# Patient Record
Sex: MALE | Race: WHITE | NOT HISPANIC OR LATINO | Employment: FULL TIME | URBAN - METROPOLITAN AREA
[De-identification: names, ages, dates, MRNs, and addresses within clinical notes are randomized per-mention and may not be internally consistent; named-entity substitution may affect disease eponyms.]

---

## 2017-03-30 ENCOUNTER — LAB VISIT (OUTPATIENT)
Dept: LAB | Facility: HOSPITAL | Age: 42
End: 2017-03-30
Payer: COMMERCIAL

## 2017-03-30 ENCOUNTER — OFFICE VISIT (OUTPATIENT)
Dept: FAMILY MEDICINE | Facility: CLINIC | Age: 42
End: 2017-03-30
Payer: COMMERCIAL

## 2017-03-30 VITALS
HEART RATE: 75 BPM | TEMPERATURE: 98 F | HEIGHT: 70 IN | OXYGEN SATURATION: 98 % | DIASTOLIC BLOOD PRESSURE: 78 MMHG | SYSTOLIC BLOOD PRESSURE: 130 MMHG | BODY MASS INDEX: 31.94 KG/M2 | WEIGHT: 223.13 LBS

## 2017-03-30 DIAGNOSIS — Z00.00 ANNUAL PHYSICAL EXAM: Primary | ICD-10-CM

## 2017-03-30 DIAGNOSIS — Z00.00 ANNUAL PHYSICAL EXAM: ICD-10-CM

## 2017-03-30 LAB
ALBUMIN SERPL BCP-MCNC: 4 G/DL
ALP SERPL-CCNC: 81 U/L
ALT SERPL W/O P-5'-P-CCNC: 29 U/L
ANION GAP SERPL CALC-SCNC: 8 MMOL/L
AST SERPL-CCNC: 44 U/L
BASOPHILS # BLD AUTO: 0.03 K/UL
BASOPHILS NFR BLD: 0.4 %
BILIRUB SERPL-MCNC: 0.7 MG/DL
BUN SERPL-MCNC: 15 MG/DL
CALCIUM SERPL-MCNC: 9.8 MG/DL
CHLORIDE SERPL-SCNC: 104 MMOL/L
CHOLEST/HDLC SERPL: 5.6 {RATIO}
CO2 SERPL-SCNC: 29 MMOL/L
CREAT SERPL-MCNC: 1.1 MG/DL
DIFFERENTIAL METHOD: ABNORMAL
EOSINOPHIL # BLD AUTO: 0.3 K/UL
EOSINOPHIL NFR BLD: 3.3 %
ERYTHROCYTE [DISTWIDTH] IN BLOOD BY AUTOMATED COUNT: 13 %
EST. GFR  (AFRICAN AMERICAN): >60 ML/MIN/1.73 M^2
EST. GFR  (NON AFRICAN AMERICAN): >60 ML/MIN/1.73 M^2
GLUCOSE SERPL-MCNC: 94 MG/DL
HCT VFR BLD AUTO: 46.9 %
HDL/CHOLESTEROL RATIO: 17.9 %
HDLC SERPL-MCNC: 173 MG/DL
HDLC SERPL-MCNC: 31 MG/DL
HGB BLD-MCNC: 16.5 G/DL
LDLC SERPL CALC-MCNC: 79.8 MG/DL
LYMPHOCYTES # BLD AUTO: 3.4 K/UL
LYMPHOCYTES NFR BLD: 44.6 %
MCH RBC QN AUTO: 28.5 PG
MCHC RBC AUTO-ENTMCNC: 35.2 %
MCV RBC AUTO: 81 FL
MONOCYTES # BLD AUTO: 0.8 K/UL
MONOCYTES NFR BLD: 10.5 %
NEUTROPHILS # BLD AUTO: 3.1 K/UL
NEUTROPHILS NFR BLD: 41.1 %
NONHDLC SERPL-MCNC: 142 MG/DL
PLATELET # BLD AUTO: 274 K/UL
PMV BLD AUTO: 9.6 FL
POTASSIUM SERPL-SCNC: 4.3 MMOL/L
PROT SERPL-MCNC: 7.8 G/DL
RBC # BLD AUTO: 5.78 M/UL
SODIUM SERPL-SCNC: 141 MMOL/L
TRIGL SERPL-MCNC: 311 MG/DL
TSH SERPL DL<=0.005 MIU/L-ACNC: 2.63 UIU/ML
WBC # BLD AUTO: 7.6 K/UL

## 2017-03-30 PROCEDURE — 84443 ASSAY THYROID STIM HORMONE: CPT

## 2017-03-30 PROCEDURE — 99212 OFFICE O/P EST SF 10 MIN: CPT | Mod: S$GLB,,,

## 2017-03-30 PROCEDURE — 1160F RVW MEDS BY RX/DR IN RCRD: CPT | Mod: S$GLB,,,

## 2017-03-30 PROCEDURE — 80053 COMPREHEN METABOLIC PANEL: CPT

## 2017-03-30 PROCEDURE — 36415 COLL VENOUS BLD VENIPUNCTURE: CPT | Mod: PO

## 2017-03-30 PROCEDURE — 85025 COMPLETE CBC W/AUTO DIFF WBC: CPT

## 2017-03-30 PROCEDURE — 80061 LIPID PANEL: CPT

## 2017-03-30 PROCEDURE — 99999 PR PBB SHADOW E&M-EST. PATIENT-LVL III: CPT | Mod: PBBFAC,,,

## 2017-03-30 NOTE — PROGRESS NOTES
Subjective:       Patient ID: Shiraz Pillai is a 41 y.o. male.    Chief Complaint: No chief complaint on file.    HPI   Patient presents for an annual physical exam.  He denies any complaints.     Review of Systems   Constitutional: Negative for activity change, appetite change, fatigue and unexpected weight change.   HENT: Negative.    Eyes: Negative.    Respiratory: Negative for cough, chest tightness, shortness of breath and wheezing.    Cardiovascular: Negative for chest pain, palpitations and leg swelling.   Gastrointestinal: Negative for constipation, diarrhea, nausea and vomiting.   Endocrine: Negative.    Genitourinary: Negative.    Musculoskeletal: Negative.    Skin: Negative for color change.   Allergic/Immunologic: Negative.    Neurological: Negative for dizziness, weakness and light-headedness.   Hematological: Negative.    Psychiatric/Behavioral: Negative for sleep disturbance.         Objective:      Physical Exam   Constitutional: He is oriented to person, place, and time. He appears well-developed and well-nourished. No distress.   HENT:   Head: Normocephalic and atraumatic. Hair is normal.   Right Ear: Hearing, tympanic membrane, external ear and ear canal normal.   Left Ear: Hearing, tympanic membrane, external ear and ear canal normal.   Nose: No mucosal edema, rhinorrhea, nose lacerations, sinus tenderness, nasal deformity, septal deviation or nasal septal hematoma. No epistaxis.  No foreign bodies. Right sinus exhibits no maxillary sinus tenderness and no frontal sinus tenderness. Left sinus exhibits no maxillary sinus tenderness and no frontal sinus tenderness.   Mouth/Throat: Uvula is midline, oropharynx is clear and moist and mucous membranes are normal.   Eyes: Conjunctivae are normal. Pupils are equal, round, and reactive to light. Right eye exhibits no discharge. Left eye exhibits no discharge.   Neck: Trachea normal, normal range of motion and phonation normal. Neck supple.  Normal carotid pulses, no hepatojugular reflux and no JVD present. Carotid bruit is not present. No tracheal deviation present. No thyroid mass and no thyromegaly present.   Cardiovascular: Normal rate, regular rhythm, normal heart sounds and intact distal pulses.  Exam reveals no gallop and no friction rub.    No murmur heard.  Pulmonary/Chest: Effort normal and breath sounds normal. No respiratory distress. He has no decreased breath sounds. He has no wheezes. He has no rhonchi. He has no rales. He exhibits no tenderness.   Musculoskeletal: Normal range of motion.   Lymphadenopathy:        Head (right side): No submental, no submandibular, no tonsillar, no preauricular, no posterior auricular and no occipital adenopathy present.        Head (left side): No submental, no submandibular, no tonsillar, no preauricular, no posterior auricular and no occipital adenopathy present.     He has no cervical adenopathy.        Right cervical: No superficial cervical, no deep cervical and no posterior cervical adenopathy present.       Left cervical: No superficial cervical, no deep cervical and no posterior cervical adenopathy present.   Neurological: He is alert and oriented to person, place, and time. He exhibits normal muscle tone. GCS eye subscore is 4. GCS verbal subscore is 5. GCS motor subscore is 6.   Skin: Skin is warm and dry. No rash noted. He is not diaphoretic. No erythema. No pallor.   Psychiatric: He has a normal mood and affect. His speech is normal and behavior is normal. Judgment and thought content normal.       Assessment:       1. Annual physical exam          Plan:   Annual physical exam  -     Lipid panel; Future; Expected date: 3/30/17  -     CBC auto differential; Future; Expected date: 3/30/17  -     Comprehensive metabolic panel; Future; Expected date: 3/30/17  -     TSH; Future; Expected date: 3/30/17          Disclaimer: This note is prepared using voice recognition software.  As such there may be  errors in the dictation.  It has not been proofread.

## 2017-03-31 DIAGNOSIS — E78.1 HYPERTRIGLYCERIDEMIA: Primary | ICD-10-CM

## 2017-03-31 RX ORDER — GEMFIBROZIL 600 MG/1
600 TABLET, FILM COATED ORAL
Qty: 180 TABLET | Refills: 3 | Status: SHIPPED | OUTPATIENT
Start: 2017-03-31 | End: 2018-04-06

## 2018-04-06 ENCOUNTER — LAB VISIT (OUTPATIENT)
Dept: LAB | Facility: HOSPITAL | Age: 43
End: 2018-04-06
Attending: NURSE PRACTITIONER
Payer: COMMERCIAL

## 2018-04-06 ENCOUNTER — OFFICE VISIT (OUTPATIENT)
Dept: FAMILY MEDICINE | Facility: CLINIC | Age: 43
End: 2018-04-06
Payer: COMMERCIAL

## 2018-04-06 VITALS
TEMPERATURE: 98 F | DIASTOLIC BLOOD PRESSURE: 71 MMHG | HEIGHT: 70 IN | SYSTOLIC BLOOD PRESSURE: 113 MMHG | HEART RATE: 68 BPM | WEIGHT: 226.63 LBS | BODY MASS INDEX: 32.45 KG/M2

## 2018-04-06 DIAGNOSIS — Z00.00 ANNUAL PHYSICAL EXAM: ICD-10-CM

## 2018-04-06 DIAGNOSIS — Z00.00 ANNUAL PHYSICAL EXAM: Primary | ICD-10-CM

## 2018-04-06 LAB
ALBUMIN SERPL BCP-MCNC: 4.2 G/DL
ALP SERPL-CCNC: 89 U/L
ALT SERPL W/O P-5'-P-CCNC: 36 U/L
ANION GAP SERPL CALC-SCNC: 7 MMOL/L
AST SERPL-CCNC: 21 U/L
BILIRUB SERPL-MCNC: 0.9 MG/DL
BUN SERPL-MCNC: 14 MG/DL
CALCIUM SERPL-MCNC: 9.6 MG/DL
CHLORIDE SERPL-SCNC: 102 MMOL/L
CHOLEST SERPL-MCNC: 153 MG/DL
CHOLEST/HDLC SERPL: 4.5 {RATIO}
CO2 SERPL-SCNC: 30 MMOL/L
CREAT SERPL-MCNC: 1.1 MG/DL
EST. GFR  (AFRICAN AMERICAN): >60 ML/MIN/1.73 M^2
EST. GFR  (NON AFRICAN AMERICAN): >60 ML/MIN/1.73 M^2
GLUCOSE SERPL-MCNC: 104 MG/DL
HDLC SERPL-MCNC: 34 MG/DL
HDLC SERPL: 22.2 %
LDLC SERPL CALC-MCNC: 87.8 MG/DL
NONHDLC SERPL-MCNC: 119 MG/DL
POTASSIUM SERPL-SCNC: 5 MMOL/L
PROT SERPL-MCNC: 8 G/DL
SODIUM SERPL-SCNC: 139 MMOL/L
TRIGL SERPL-MCNC: 156 MG/DL

## 2018-04-06 PROCEDURE — 80053 COMPREHEN METABOLIC PANEL: CPT

## 2018-04-06 PROCEDURE — 36415 COLL VENOUS BLD VENIPUNCTURE: CPT | Mod: PO

## 2018-04-06 PROCEDURE — 80061 LIPID PANEL: CPT

## 2018-04-06 PROCEDURE — 99396 PREV VISIT EST AGE 40-64: CPT | Mod: S$GLB,,, | Performed by: NURSE PRACTITIONER

## 2018-04-06 PROCEDURE — 99999 PR PBB SHADOW E&M-EST. PATIENT-LVL III: CPT | Mod: PBBFAC,,, | Performed by: NURSE PRACTITIONER

## 2018-04-06 NOTE — PROGRESS NOTES
Subjective:       Patient ID: Shiraz Pillai is a 42 y.o. male.    Chief Complaint: Annual Exam    HPI     He comes into the office for annual wellness exam.  He has no complaints at this time.  He denies tobacco alcohol or drug use.  He denies shortness of breath or chest pain, no dizziness or syncope, no headaches or blurry vision.  He is fasting for labs today.    Review of Systems   Constitutional: Negative for fatigue, fever and unexpected weight change.   HENT: Negative for ear pain and sore throat.    Eyes: Negative.  Negative for pain and visual disturbance.   Respiratory: Negative for cough and shortness of breath.    Cardiovascular: Negative for chest pain and palpitations.   Gastrointestinal: Negative for abdominal pain, diarrhea, nausea and vomiting.   Genitourinary: Negative for dysuria and frequency.   Musculoskeletal: Negative for arthralgias and myalgias.   Skin: Negative for color change and rash.   Neurological: Negative for dizziness and headaches.   Psychiatric/Behavioral: Negative for sleep disturbance. The patient is not nervous/anxious.        Vitals:    04/06/18 0845   BP: 113/71   Pulse: 68   Temp: 98.4 °F (36.9 °C)       Objective:     No current outpatient prescriptions on file.     No current facility-administered medications for this visit.        Physical Exam   Constitutional: He is oriented to person, place, and time. He appears well-developed. No distress.   HENT:   Head: Normocephalic and atraumatic.   Eyes: EOM are normal. Pupils are equal, round, and reactive to light.   Neck: Normal range of motion. Neck supple.   Cardiovascular: Normal rate and regular rhythm.    Pulmonary/Chest: Effort normal and breath sounds normal.   Musculoskeletal: Normal range of motion.   Neurological: He is alert and oriented to person, place, and time.   Skin: Skin is warm and dry. No rash noted.   Psychiatric: He has a normal mood and affect. Thought content normal.   Nursing note and  vitals reviewed.      Assessment:       1. Annual physical exam        Plan:   Annual physical exam  -     Comprehensive metabolic panel; Future; Expected date: 04/06/2018  -     Lipid panel; Future; Expected date: 04/06/2018        No Follow-up on file.

## 2018-08-20 ENCOUNTER — TELEPHONE (OUTPATIENT)
Dept: FAMILY MEDICINE | Facility: CLINIC | Age: 43
End: 2018-08-20

## 2018-08-20 ENCOUNTER — OFFICE VISIT (OUTPATIENT)
Dept: FAMILY MEDICINE | Facility: CLINIC | Age: 43
End: 2018-08-20
Payer: COMMERCIAL

## 2018-08-20 VITALS
HEART RATE: 91 BPM | SYSTOLIC BLOOD PRESSURE: 114 MMHG | BODY MASS INDEX: 32.16 KG/M2 | DIASTOLIC BLOOD PRESSURE: 78 MMHG | TEMPERATURE: 98 F | WEIGHT: 224.63 LBS | HEIGHT: 70 IN

## 2018-08-20 DIAGNOSIS — T78.40XA ALLERGIC REACTION, INITIAL ENCOUNTER: Primary | ICD-10-CM

## 2018-08-20 DIAGNOSIS — R60.9 EDEMA, UNSPECIFIED TYPE: ICD-10-CM

## 2018-08-20 PROCEDURE — 99999 PR PBB SHADOW E&M-EST. PATIENT-LVL III: CPT | Mod: PBBFAC,,, | Performed by: NURSE PRACTITIONER

## 2018-08-20 PROCEDURE — 99214 OFFICE O/P EST MOD 30 MIN: CPT | Mod: 25,S$GLB,, | Performed by: NURSE PRACTITIONER

## 2018-08-20 PROCEDURE — 96372 THER/PROPH/DIAG INJ SC/IM: CPT | Mod: S$GLB,,, | Performed by: NURSE PRACTITIONER

## 2018-08-20 RX ORDER — EPINEPHRINE 0.3 MG/.3ML
1 INJECTION SUBCUTANEOUS ONCE
Qty: 0.3 ML | Refills: 0 | Status: SHIPPED | OUTPATIENT
Start: 2018-08-20 | End: 2020-12-16

## 2018-08-20 RX ORDER — DEXAMETHASONE SODIUM PHOSPHATE 4 MG/ML
4 INJECTION, SOLUTION INTRA-ARTICULAR; INTRALESIONAL; INTRAMUSCULAR; INTRAVENOUS; SOFT TISSUE
Status: COMPLETED | OUTPATIENT
Start: 2018-08-20 | End: 2018-08-20

## 2018-08-20 RX ADMIN — DEXAMETHASONE SODIUM PHOSPHATE 4 MG: 4 INJECTION, SOLUTION INTRA-ARTICULAR; INTRALESIONAL; INTRAMUSCULAR; INTRAVENOUS; SOFT TISSUE at 09:08

## 2018-08-20 NOTE — PATIENT INSTRUCTIONS
Use over the counter liquid benadryl 2 tsp at onset of swelling to lips, mouth or difficulty breathing or swalling

## 2018-08-20 NOTE — TELEPHONE ENCOUNTER
----- Message from Adriana Urena sent at 8/20/2018  4:14 PM CDT -----  Contact: Pt.   Pt. Wife is calling regarding script. Pt wife stated that pharmacy is needing a Pre Authiozation from [facility] [employer]. ..[phone] (home) [facility] is needing the Authorization because [facility] pharmacy does not have the EpiPen   [facility] Fax# [phone]       .

## 2018-08-20 NOTE — TELEPHONE ENCOUNTER
Returned pt's call. Pt's wife stated that her pharmacy was requiring prior authorization for an epipen. I assured pt's wife I would initiate the prior authorization asap.

## 2018-08-22 ENCOUNTER — TELEPHONE (OUTPATIENT)
Dept: FAMILY MEDICINE | Facility: CLINIC | Age: 43
End: 2018-08-22

## 2018-08-22 LAB
A ALTERNATA IGE QN: <0.1 KU/L
A FUMIGATUS IGE QN: <0.1 KU/L
ALBUMIN SERPL-MCNC: 4.6 G/DL (ref 3.6–5.1)
ALBUMIN/GLOB SERPL: 1.5 (CALC) (ref 1–2.5)
ALMOND IGE QN: <0.1 KU/L
ALP SERPL-CCNC: 80 U/L (ref 40–115)
ALT SERPL-CCNC: 26 U/L (ref 9–46)
ANA PAT SER IF-IMP: ABNORMAL
ANA SER QL IF: POSITIVE
ANA TITR SER IF: ABNORMAL TITER
AST SERPL-CCNC: 17 U/L (ref 10–40)
BERMUDA GRASS IGE QN: <0.1 KU/L
BILIRUB SERPL-MCNC: 0.8 MG/DL (ref 0.2–1.2)
BOXELDER IGE QN: <0.1 KU/L
BUN SERPL-MCNC: 11 MG/DL (ref 7–25)
BUN/CREAT SERPL: ABNORMAL (CALC) (ref 6–22)
C HERBARUM IGE QN: <0.1 KU/L
CALCIUM SERPL-MCNC: 9.7 MG/DL (ref 8.6–10.3)
CALIF WALNUT POLN IGE QN: <0.1 KU/L
CASHEW NUT IGE QN: <0.1 KU/L
CAT DANDER IGE QN: <0.1 KU/L
CHLORIDE SERPL-SCNC: 102 MMOL/L (ref 98–110)
CMN PIGWEED IGE QN: <0.1 KU/L
CO2 SERPL-SCNC: 28 MMOL/L (ref 20–32)
CODFISH IGE QN: <0.1 KU/L
COMMON RAGWEED IGE QN: <0.1 KU/L
CREAT SERPL-MCNC: 0.92 MG/DL (ref 0.6–1.35)
CRP SERPL-MCNC: 16.2 MG/L
D FARINAE IGE QN: <0.1 KU/L
D PTERONYSS IGE QN: <0.1 KU/L
DEPRECATED A ALTERNATA IGE RAST QL: 0
DEPRECATED A FUMIGATUS IGE RAST QL: 0
DEPRECATED ALMOND IGE RAST QL: 0
DEPRECATED BERMUDA GRASS IGE RAST QL: 0
DEPRECATED BOXELDER IGE RAST QL: 0
DEPRECATED C HERBARUM IGE RAST QL: 0
DEPRECATED CALIF WALNUT POLN IGE RAST QL: 0
DEPRECATED CASHEW NUT IGE RAST QL: 0
DEPRECATED CAT DANDER IGE RAST QL: 0
DEPRECATED CODFISH IGE RAST QL: 0
DEPRECATED COMMON PIGWEED IGE RAST QL: 0
DEPRECATED COMMON RAGWEED IGE RAST QL: 0
DEPRECATED D FARINAE IGE RAST QL: 0
DEPRECATED D PTERONYSS IGE RAST QL: 0
DEPRECATED DOG DANDER IGE RAST QL: 0
DEPRECATED EGG WHITE IGE RAST QL: 0
DEPRECATED HAZELNUT IGE RAST QL: 0
DEPRECATED MARSH ELDER IGE RAST QL: 0
DEPRECATED MILK IGE RAST QL: 0
DEPRECATED MOUSE URINE PROT IGE RAST QL: 0
DEPRECATED MT JUNIPER IGE RAST QL: 0
DEPRECATED P NOTATUM IGE RAST QL: 0
DEPRECATED PEANUT IGE RAST QL: 0
DEPRECATED PECAN/HICK TREE IGE RAST QL: 0
DEPRECATED ROACH IGE RAST QL: 0
DEPRECATED SALMON IGE RAST QL: 0
DEPRECATED SCALLOP IGE RAST QL: 0
DEPRECATED SESAME SEED IGE RAST QL: 0
DEPRECATED SHRIMP IGE RAST QL: 0
DEPRECATED SILVER BIRCH IGE RAST QL: 0
DEPRECATED SOYBEAN IGE RAST QL: 0
DEPRECATED TIMOTHY IGE RAST QL: 0
DEPRECATED TUNA IGE RAST QL: 0
DEPRECATED WALNUT IGE RAST QL: 0
DEPRECATED WHEAT IGE RAST QL: 0
DEPRECATED WHITE ELM IGE RAST QL: 0
DEPRECATED WHITE MULBERRY IGE RAST QL: 0
DEPRECATED WHITE OAK IGE RAST QL: 0
DOG DANDER IGE QN: <0.1 KU/L
EGG WHITE IGE QN: <0.1 KU/L
ERYTHROCYTE [SEDIMENTATION RATE] IN BLOOD BY WESTERGREN METHOD: 1 MM/H
GFR SERPL CREATININE-BSD FRML MDRD: 102 ML/MIN/1.73M2
GLOBULIN SER CALC-MCNC: 3.1 G/DL (CALC) (ref 1.9–3.7)
GLUCOSE SERPL-MCNC: 101 MG/DL (ref 65–99)
HAZELNUT IGE QN: <0.1 KU/L
IGE SERPL-ACNC: 79 KU/L
MARSH ELDER IGE QN: <0.1 KU/L
MILK IGE QN: <0.1 KU/L
MOUSE URINE PROT IGE QN: <0.1 KU/L
MT JUNIPER IGE QN: <0.1 KU/L
P NOTATUM IGE QN: <0.1 KU/L
PEANUT IGE QN: <0.1 KU/L
PECAN/HICK TREE IGE QN: <0.1 KU/L
POTASSIUM SERPL-SCNC: 5.5 MMOL/L (ref 3.5–5.3)
PROT SERPL-MCNC: 7.7 G/DL (ref 6.1–8.1)
REF LAB TEST REF RANGE: NORMAL
ROACH IGE QN: <0.1 KU/L
SALMON IGE QN: <0.1 KU/L
SCALLOP IGE QN: <0.1 KU/L
SESAME SEED IGE QN: <0.1 KU/L
SHRIMP IGE QN: <0.1 KU/L
SILVER BIRCH IGE QN: <0.1 KU/L
SODIUM SERPL-SCNC: 138 MMOL/L (ref 135–146)
SOYBEAN IGE QN: <0.1 KU/L
TIMOTHY IGE QN: <0.1 KU/L
TSH SERPL-ACNC: 3.08 MIU/L (ref 0.4–4.5)
TUNA IGE QN: <0.1 KU/L
URATE SERPL-MCNC: 5.5 MG/DL (ref 4–8)
WALNUT IGE QN: <0.1 KU/L
WHEAT IGE QN: <0.1 KU/L
WHITE ELM IGE QN: <0.1 KU/L
WHITE MULBERRY IGE QN: <0.1 KU/L
WHITE OAK IGE QN: <0.1 KU/L

## 2018-08-22 NOTE — TELEPHONE ENCOUNTER
----- Message from Krystal Arnett sent at 8/22/2018 12:02 PM CDT -----  Contact: wife/Becky  States he is having trouble getting his EPI pen. States the insurance sent over an authorization form and they haven't received it. States its time sensitive, if they don't receive it, they will automatically close it out. Please call Becky at [phone]. Thank you

## 2018-08-24 ENCOUNTER — TELEPHONE (OUTPATIENT)
Dept: RHEUMATOLOGY | Facility: CLINIC | Age: 43
End: 2018-08-24

## 2018-08-24 ENCOUNTER — OFFICE VISIT (OUTPATIENT)
Dept: FAMILY MEDICINE | Facility: CLINIC | Age: 43
End: 2018-08-24
Payer: COMMERCIAL

## 2018-08-24 ENCOUNTER — HOSPITAL ENCOUNTER (OUTPATIENT)
Dept: RADIOLOGY | Facility: HOSPITAL | Age: 43
Discharge: HOME | End: 2018-08-24
Attending: NURSE PRACTITIONER
Payer: COMMERCIAL

## 2018-08-24 VITALS
WEIGHT: 225.38 LBS | HEIGHT: 70 IN | DIASTOLIC BLOOD PRESSURE: 69 MMHG | TEMPERATURE: 98 F | SYSTOLIC BLOOD PRESSURE: 113 MMHG | BODY MASS INDEX: 32.27 KG/M2 | HEART RATE: 72 BPM

## 2018-08-24 DIAGNOSIS — R60.9 EDEMA, UNSPECIFIED TYPE: ICD-10-CM

## 2018-08-24 DIAGNOSIS — M25.40 JOINT SWELLING: ICD-10-CM

## 2018-08-24 DIAGNOSIS — R79.82 ELEVATED C-REACTIVE PROTEIN (CRP): ICD-10-CM

## 2018-08-24 DIAGNOSIS — R76.8 ANA POSITIVE: Primary | ICD-10-CM

## 2018-08-24 DIAGNOSIS — R76.8 ANA POSITIVE: ICD-10-CM

## 2018-08-24 DIAGNOSIS — E87.5 HYPERKALEMIA: ICD-10-CM

## 2018-08-24 PROCEDURE — 73130 X-RAY EXAM OF HAND: CPT | Mod: 26,50,, | Performed by: RADIOLOGY

## 2018-08-24 PROCEDURE — 73130 X-RAY EXAM OF HAND: CPT | Mod: 50,TC,PO

## 2018-08-24 PROCEDURE — 99999 PR PBB SHADOW E&M-EST. PATIENT-LVL V: CPT | Mod: PBBFAC,,, | Performed by: NURSE PRACTITIONER

## 2018-08-24 PROCEDURE — 73630 X-RAY EXAM OF FOOT: CPT | Mod: 50,TC,PO

## 2018-08-24 PROCEDURE — 99214 OFFICE O/P EST MOD 30 MIN: CPT | Mod: S$GLB,,, | Performed by: NURSE PRACTITIONER

## 2018-08-24 PROCEDURE — 73630 X-RAY EXAM OF FOOT: CPT | Mod: 26,50,, | Performed by: RADIOLOGY

## 2018-08-24 RX ORDER — PREDNISONE 20 MG/1
20 TABLET ORAL DAILY
Qty: 30 TABLET | Refills: 0 | Status: SHIPPED | OUTPATIENT
Start: 2018-08-24 | End: 2018-09-03

## 2018-08-24 RX ORDER — DIPHENHYDRAMINE HCL 12.5MG/5ML
LIQUID (ML) ORAL
COMMUNITY
Start: 2018-08-20 | End: 2019-06-07

## 2018-08-24 NOTE — PROGRESS NOTES
Subjective:       Patient ID: Shiraz Pillai is a 42 y.o. male.    Chief Complaint: Facial Swelling; Nausea; and Chest Pain    HPI     He comes into the office for follow-up.  He was seen last week with complaints of episodes of edema.  He has continued to have facial swelling, swelling to the hands and feet.  He reports mild improvement after his last office visit when he was given a steroid injection.  He has continued to use Benadryl on an as-needed basis with no improvement.  He reports waking in the middle of the night with swelling to his face and hands.  He has mild chest tightness, increased thirst.  He denies difficulty breathing or swallowing.    His last visit labs were drawn which included an elevated CRP and JHOAN.    Review of Systems   Constitutional: Negative for activity change, fatigue, fever and unexpected weight change.   HENT: Negative for ear pain, hearing loss, rhinorrhea, sore throat and trouble swallowing.    Eyes: Negative.  Negative for pain, discharge and visual disturbance.   Respiratory: Positive for chest tightness. Negative for cough, shortness of breath and wheezing.    Cardiovascular: Negative for chest pain and palpitations.   Gastrointestinal: Negative for abdominal pain, blood in stool, constipation, diarrhea, nausea and vomiting.   Endocrine: Positive for polydipsia. Negative for polyuria.   Genitourinary: Negative for difficulty urinating, dysuria, frequency, hematuria and urgency.   Musculoskeletal: Negative for arthralgias, joint swelling, myalgias and neck pain.   Skin: Negative for color change and rash.   Neurological: Negative for dizziness, weakness and headaches.   Psychiatric/Behavioral: Negative for confusion, dysphoric mood and sleep disturbance. The patient is not nervous/anxious.        Vitals:    08/24/18 0747   BP: 113/69   Pulse: 72   Temp: 97.8 °F (36.6 °C)       Objective:     Current Outpatient Medications   Medication Sig Dispense Refill     diphenhydrAMINE (BENADRYL ALLERGY) 12.5 mg/5 mL liquid       EPINEPHrine (EPIPEN) 0.3 mg/0.3 mL AtIn Inject 0.3 mLs (0.3 mg total) into the muscle once. for 1 dose 0.3 mL 0    predniSONE (DELTASONE) 20 MG tablet Take 1 tablet (20 mg total) by mouth once daily. for 10 days 30 tablet 0    ranitidine (ZANTAC) 300 MG tablet Take 1 tablet (300 mg total) by mouth every evening. 30 tablet 0     No current facility-administered medications for this visit.        Physical Exam   Constitutional: He is oriented to person, place, and time. He appears well-developed. No distress.   HENT:   Head: Normocephalic and atraumatic.   Swelling to upper lip    Eyes: EOM are normal. Pupils are equal, round, and reactive to light.   Neck: Normal range of motion. Neck supple.   Cardiovascular: Normal rate and regular rhythm.   Pulmonary/Chest: Effort normal and breath sounds normal.   Musculoskeletal: Normal range of motion.        Left hand: He exhibits swelling.   Neurological: He is alert and oriented to person, place, and time.   Skin: Skin is warm and dry. No rash noted.   Psychiatric: He has a normal mood and affect. Thought content normal.   Nursing note and vitals reviewed.      Assessment:       1. JHOAN positive    2. Elevated C-reactive protein (CRP)    3. Edema, unspecified type    4. Joint swelling    5. Hyperkalemia        Plan:   JHOAN positive  -     Ambulatory referral to Rheumatology  -     Cancel: X-Ray Hand 2 View Left; Future; Expected date: 08/24/2018  -     X-Ray Hand 2 View Right; Future; Expected date: 08/24/2018  -     X-Ray Foot 2 View Right; Future; Expected date: 08/24/2018  -     Cancel: X-Ray Foot 2 View Left; Future; Expected date: 08/24/2018  -     Basic metabolic panel; Future; Expected date: 08/24/2018  -     C1 ESTERASE INHIBITOR PANEL; Future; Expected date: 08/24/2018  -     Ferritin; Future; Expected date: 08/24/2018  -     C3 COMPLEMENT; Future; Expected date: 08/24/2018  -     C4 COMPLEMENT; Future;  Expected date: 08/24/2018  -     CH50 COMPLEMENT (TOTAL); Future; Expected date: 08/24/2018  -     Urinalysis  -     Anti Sm/RNP Antibody; Future; Expected date: 08/24/2018  -     ANTI -SSA ANTIBODY; Future; Expected date: 08/24/2018  -     ANTI-SSB ANTIBODY; Future; Expected date: 08/24/2018  -     Complement, Alternate Pathway (AH50); Future; Expected date: 08/24/2018  -     ANTI-SCLERODERMA ANTIBODY; Future; Expected date: 08/24/2018  -     Ambulatory referral to Rheumatology    Elevated C-reactive protein (CRP)  -     Ambulatory referral to Rheumatology  -     Cancel: X-Ray Hand 2 View Left; Future; Expected date: 08/24/2018  -     X-Ray Hand 2 View Right; Future; Expected date: 08/24/2018  -     X-Ray Foot 2 View Right; Future; Expected date: 08/24/2018  -     Cancel: X-Ray Foot 2 View Left; Future; Expected date: 08/24/2018  -     Basic metabolic panel; Future; Expected date: 08/24/2018  -     C1 ESTERASE INHIBITOR PANEL; Future; Expected date: 08/24/2018  -     Ferritin; Future; Expected date: 08/24/2018  -     C3 COMPLEMENT; Future; Expected date: 08/24/2018  -     C4 COMPLEMENT; Future; Expected date: 08/24/2018  -     CH50 COMPLEMENT (TOTAL); Future; Expected date: 08/24/2018  -     Urinalysis  -     Anti Sm/RNP Antibody; Future; Expected date: 08/24/2018  -     ANTI -SSA ANTIBODY; Future; Expected date: 08/24/2018  -     ANTI-SSB ANTIBODY; Future; Expected date: 08/24/2018  -     Complement, Alternate Pathway (AH50); Future; Expected date: 08/24/2018  -     ANTI-SCLERODERMA ANTIBODY; Future; Expected date: 08/24/2018  -     Ambulatory referral to Rheumatology    Edema, unspecified type  -     Ambulatory referral to Rheumatology  -     Basic metabolic panel; Future; Expected date: 08/24/2018  -     C1 ESTERASE INHIBITOR PANEL; Future; Expected date: 08/24/2018  -     Ferritin; Future; Expected date: 08/24/2018  -     C3 COMPLEMENT; Future; Expected date: 08/24/2018  -     C4 COMPLEMENT; Future; Expected  date: 08/24/2018  -     CH50 COMPLEMENT (TOTAL); Future; Expected date: 08/24/2018  -     Urinalysis  -     Anti Sm/RNP Antibody; Future; Expected date: 08/24/2018  -     ANTI -SSA ANTIBODY; Future; Expected date: 08/24/2018  -     ANTI-SSB ANTIBODY; Future; Expected date: 08/24/2018  -     Complement, Alternate Pathway (AH50); Future; Expected date: 08/24/2018  -     ANTI-SCLERODERMA ANTIBODY; Future; Expected date: 08/24/2018    Joint swelling  -     Cancel: X-Ray Hand 2 View Left; Future; Expected date: 08/24/2018  -     X-Ray Hand 2 View Right; Future; Expected date: 08/24/2018  -     X-Ray Foot 2 View Right; Future; Expected date: 08/24/2018  -     Cancel: X-Ray Foot 2 View Left; Future; Expected date: 08/24/2018  -     Basic metabolic panel; Future; Expected date: 08/24/2018  -     C1 ESTERASE INHIBITOR PANEL; Future; Expected date: 08/24/2018  -     Ferritin; Future; Expected date: 08/24/2018  -     C3 COMPLEMENT; Future; Expected date: 08/24/2018  -     C4 COMPLEMENT; Future; Expected date: 08/24/2018  -     CH50 COMPLEMENT (TOTAL); Future; Expected date: 08/24/2018  -     Urinalysis  -     Anti Sm/RNP Antibody; Future; Expected date: 08/24/2018  -     ANTI -SSA ANTIBODY; Future; Expected date: 08/24/2018  -     ANTI-SSB ANTIBODY; Future; Expected date: 08/24/2018  -     Complement, Alternate Pathway (AH50); Future; Expected date: 08/24/2018  -     ANTI-SCLERODERMA ANTIBODY; Future; Expected date: 08/24/2018  -     Ambulatory referral to Rheumatology    Hyperkalemia  -     Basic metabolic panel; Future; Expected date: 08/24/2018    Other orders  -     predniSONE (DELTASONE) 20 MG tablet; Take 1 tablet (20 mg total) by mouth once daily. for 10 days  Dispense: 30 tablet; Refill: 0  -     ranitidine (ZANTAC) 300 MG tablet; Take 1 tablet (300 mg total) by mouth every evening.  Dispense: 30 tablet; Refill: 0     I spoke with Dr. Wilmar Denise, she will be able to see him on Tuesday.  He is going to have labs  done in the office today prior to his visit with her    No Follow-up on file.    There are no Patient Instructions on file for this visit.

## 2018-08-27 NOTE — PROGRESS NOTES
Subjective:       Patient ID: Shiraz Pillai is a 42 y.o. male.    Chief Complaint: Edema    Edema   This is a new problem. The current episode started 1 to 4 weeks ago. The problem occurs intermittently. The problem has been waxing and waning (episodes of swelling to hands, feet, lips, tongue). Pertinent negatives include no abdominal pain, arthralgias, chest pain, coughing, fatigue, fever, headaches, myalgias, nausea, rash, sore throat or vomiting. Nothing aggravates the symptoms. He has tried NSAIDs for the symptoms. The treatment provided no relief.       Review of Systems   Constitutional: Negative for fatigue, fever and unexpected weight change.   HENT: Positive for facial swelling. Negative for ear pain and sore throat.    Eyes: Negative.  Negative for pain and visual disturbance.   Respiratory: Negative for cough and shortness of breath.    Cardiovascular: Negative for chest pain and palpitations.   Gastrointestinal: Negative for abdominal pain, diarrhea, nausea and vomiting.   Genitourinary: Negative for dysuria and frequency.   Musculoskeletal: Negative for arthralgias and myalgias.   Skin: Negative for color change and rash.   Neurological: Negative for dizziness and headaches.   Psychiatric/Behavioral: Negative for sleep disturbance. The patient is not nervous/anxious.        Vitals:    08/20/18 0811   BP: 114/78   Pulse: 91   Temp: 98.3 °F (36.8 °C)       Objective:     Current Outpatient Medications   Medication Sig Dispense Refill    diphenhydrAMINE (BENADRYL ALLERGY) 12.5 mg/5 mL liquid       EPINEPHrine (EPIPEN) 0.3 mg/0.3 mL AtIn Inject 0.3 mLs (0.3 mg total) into the muscle once. for 1 dose 0.3 mL 0    predniSONE (DELTASONE) 20 MG tablet Take 1 tablet (20 mg total) by mouth once daily. for 10 days 30 tablet 0    ranitidine (ZANTAC) 300 MG tablet Take 1 tablet (300 mg total) by mouth every evening. 30 tablet 0     No current facility-administered medications for this visit.         Physical Exam   Constitutional: He is oriented to person, place, and time. He appears well-developed. No distress.   HENT:   Head: Normocephalic and atraumatic.   Facial swelling to left forehead and left upper lip   Eyes: EOM are normal. Pupils are equal, round, and reactive to light.   Neck: Normal range of motion. Neck supple.   Cardiovascular: Normal rate and regular rhythm.   Pulmonary/Chest: Effort normal and breath sounds normal.   Musculoskeletal: Normal range of motion.        Left hand: He exhibits swelling.   Neurological: He is alert and oriented to person, place, and time.   Skin: Skin is warm and dry. No rash noted.   Psychiatric: He has a normal mood and affect. Thought content normal.   Nursing note and vitals reviewed.      Assessment:       1. Allergic reaction, initial encounter    2. Edema, unspecified type        Plan:   Allergic reaction, initial encounter  -     Comprehensive metabolic panel; Future; Expected date: 08/20/2018  -     TSH; Future; Expected date: 08/20/2018  -     C-reactive protein; Future; Expected date: 08/20/2018  -     Sedimentation rate; Future; Expected date: 08/20/2018  -     Uric acid; Future; Expected date: 08/20/2018  -     Food Allergy Panel; Future; Expected date: 08/20/2018  -     JHOAN; Future; Expected date: 08/20/2018  -     dexamethasone injection 4 mg; Inject 1 mL (4 mg total) into the muscle one time.    Edema, unspecified type  -     Comprehensive metabolic panel; Future; Expected date: 08/20/2018  -     TSH; Future; Expected date: 08/20/2018  -     C-reactive protein; Future; Expected date: 08/20/2018  -     Sedimentation rate; Future; Expected date: 08/20/2018  -     Uric acid; Future; Expected date: 08/20/2018  -     Food Allergy Panel; Future; Expected date: 08/20/2018  -     JHOAN; Future; Expected date: 08/20/2018  -     dexamethasone injection 4 mg; Inject 1 mL (4 mg total) into the muscle one time.    Other orders  -     EPINEPHrine (EPIPEN) 0.3  mg/0.3 mL AtIn; Inject 0.3 mLs (0.3 mg total) into the muscle once. for 1 dose  Dispense: 0.3 mL; Refill: 0  -     Sedimentation rate, automated  -     JHOAN IFA screen with reflex to titer and pattern  -     Respiratory Allergy Profile Region VI        No Follow-up on file.    Patient Instructions   Use over the counter liquid benadryl 2 tsp at onset of swelling to lips, mouth or difficulty breathing or swalling

## 2018-08-28 ENCOUNTER — TELEPHONE (OUTPATIENT)
Dept: FAMILY MEDICINE | Facility: CLINIC | Age: 43
End: 2018-08-28

## 2018-08-28 NOTE — TELEPHONE ENCOUNTER
Pt has a confirmed appt at [facility] for today. Lab flowsheet from 8/24/2018 labs faxed to [facility] as requested.

## 2018-08-28 NOTE — TELEPHONE ENCOUNTER
----- Message from Anu Lawrence NP sent at 8/27/2018  3:12 PM CDT -----  Please call [facility] and speak with Vilma. Make sure he was given an appt and see if they need us to fax all of his results  I've already spoken to pt with results

## 2019-06-07 ENCOUNTER — LAB VISIT (OUTPATIENT)
Dept: LAB | Facility: HOSPITAL | Age: 44
End: 2019-06-07
Attending: NURSE PRACTITIONER
Payer: COMMERCIAL

## 2019-06-07 ENCOUNTER — OFFICE VISIT (OUTPATIENT)
Dept: FAMILY MEDICINE | Facility: CLINIC | Age: 44
End: 2019-06-07
Payer: COMMERCIAL

## 2019-06-07 VITALS
HEART RATE: 77 BPM | WEIGHT: 234.63 LBS | TEMPERATURE: 98 F | HEIGHT: 70 IN | DIASTOLIC BLOOD PRESSURE: 70 MMHG | SYSTOLIC BLOOD PRESSURE: 107 MMHG | BODY MASS INDEX: 33.59 KG/M2

## 2019-06-07 DIAGNOSIS — Z00.00 ANNUAL PHYSICAL EXAM: ICD-10-CM

## 2019-06-07 DIAGNOSIS — Z00.00 ANNUAL PHYSICAL EXAM: Primary | ICD-10-CM

## 2019-06-07 LAB
ALBUMIN SERPL BCP-MCNC: 4.1 G/DL (ref 3.5–5.2)
ALP SERPL-CCNC: 82 U/L (ref 55–135)
ALT SERPL W/O P-5'-P-CCNC: 30 U/L (ref 10–44)
ANION GAP SERPL CALC-SCNC: 8 MMOL/L (ref 8–16)
AST SERPL-CCNC: 22 U/L (ref 10–40)
BASOPHILS # BLD AUTO: 0.03 K/UL (ref 0–0.2)
BASOPHILS NFR BLD: 0.4 % (ref 0–1.9)
BILIRUB SERPL-MCNC: 0.7 MG/DL (ref 0.1–1)
BUN SERPL-MCNC: 12 MG/DL (ref 6–20)
CALCIUM SERPL-MCNC: 10.1 MG/DL (ref 8.7–10.5)
CHLORIDE SERPL-SCNC: 103 MMOL/L (ref 95–110)
CHOLEST SERPL-MCNC: 166 MG/DL (ref 120–199)
CHOLEST/HDLC SERPL: 4.3 {RATIO} (ref 2–5)
CO2 SERPL-SCNC: 26 MMOL/L (ref 23–29)
CREAT SERPL-MCNC: 1 MG/DL (ref 0.5–1.4)
DIFFERENTIAL METHOD: NORMAL
EOSINOPHIL # BLD AUTO: 0.2 K/UL (ref 0–0.5)
EOSINOPHIL NFR BLD: 3.4 % (ref 0–8)
ERYTHROCYTE [DISTWIDTH] IN BLOOD BY AUTOMATED COUNT: 14.3 % (ref 11.5–14.5)
EST. GFR  (AFRICAN AMERICAN): >60 ML/MIN/1.73 M^2
EST. GFR  (NON AFRICAN AMERICAN): >60 ML/MIN/1.73 M^2
GLUCOSE SERPL-MCNC: 93 MG/DL (ref 70–110)
HCT VFR BLD AUTO: 48.2 % (ref 40–54)
HDLC SERPL-MCNC: 39 MG/DL (ref 40–75)
HDLC SERPL: 23.5 % (ref 20–50)
HGB BLD-MCNC: 16.2 G/DL (ref 14–18)
LDLC SERPL CALC-MCNC: 100.6 MG/DL (ref 63–159)
LYMPHOCYTES # BLD AUTO: 3.1 K/UL (ref 1–4.8)
LYMPHOCYTES NFR BLD: 43 % (ref 18–48)
MCH RBC QN AUTO: 27.7 PG (ref 27–31)
MCHC RBC AUTO-ENTMCNC: 33.6 G/DL (ref 32–36)
MCV RBC AUTO: 82 FL (ref 82–98)
MONOCYTES # BLD AUTO: 0.7 K/UL (ref 0.3–1)
MONOCYTES NFR BLD: 9.4 % (ref 4–15)
NEUTROPHILS # BLD AUTO: 3.1 K/UL (ref 1.8–7.7)
NEUTROPHILS NFR BLD: 43.8 % (ref 38–73)
NONHDLC SERPL-MCNC: 127 MG/DL
PLATELET # BLD AUTO: 323 K/UL (ref 150–350)
PMV BLD AUTO: 9.7 FL (ref 9.2–12.9)
POTASSIUM SERPL-SCNC: 4.5 MMOL/L (ref 3.5–5.1)
PROT SERPL-MCNC: 7.6 G/DL (ref 6–8.4)
RBC # BLD AUTO: 5.85 M/UL (ref 4.6–6.2)
SODIUM SERPL-SCNC: 137 MMOL/L (ref 136–145)
TRIGL SERPL-MCNC: 132 MG/DL (ref 30–150)
WBC # BLD AUTO: 7.09 K/UL (ref 3.9–12.7)

## 2019-06-07 PROCEDURE — 99999 PR PBB SHADOW E&M-EST. PATIENT-LVL III: ICD-10-PCS | Mod: PBBFAC,,, | Performed by: NURSE PRACTITIONER

## 2019-06-07 PROCEDURE — 80061 LIPID PANEL: CPT

## 2019-06-07 PROCEDURE — 99396 PR PREVENTIVE VISIT,EST,40-64: ICD-10-PCS | Mod: 25,S$GLB,, | Performed by: NURSE PRACTITIONER

## 2019-06-07 PROCEDURE — 36415 COLL VENOUS BLD VENIPUNCTURE: CPT | Mod: PO

## 2019-06-07 PROCEDURE — 85025 COMPLETE CBC W/AUTO DIFF WBC: CPT | Mod: PO

## 2019-06-07 PROCEDURE — 80053 COMPREHEN METABOLIC PANEL: CPT

## 2019-06-07 PROCEDURE — 99396 PREV VISIT EST AGE 40-64: CPT | Mod: 25,S$GLB,, | Performed by: NURSE PRACTITIONER

## 2019-06-07 PROCEDURE — 99999 PR PBB SHADOW E&M-EST. PATIENT-LVL III: CPT | Mod: PBBFAC,,, | Performed by: NURSE PRACTITIONER

## 2019-06-07 NOTE — PROGRESS NOTES
Subjective:       Patient ID: Shiraz Pillai is a 43 y.o. male.    Chief Complaint: Annual Exam    HPI     He comes in the office for routine annual wellness exam with fasting labs.  He has no complaints at this time.  He denies shortness of breath or chest pain, no dizziness or syncope, no headaches or blurry vision.  Last year he was seen for recurrence facial swelling, he denies having any other episodes.  He does continue to carry an EpiPen.  He is a nonsmoker.  He remains physically active.  He follows a regular diet.    Review of Systems   Constitutional: Negative for fatigue, fever and unexpected weight change.   HENT: Negative for ear pain and sore throat.    Eyes: Negative.  Negative for pain and visual disturbance.   Respiratory: Negative for cough and shortness of breath.    Cardiovascular: Negative for chest pain and palpitations.   Gastrointestinal: Negative for abdominal pain, diarrhea, nausea and vomiting.   Genitourinary: Negative for dysuria and frequency.   Musculoskeletal: Negative for arthralgias and myalgias.   Skin: Negative for color change and rash.   Neurological: Negative for dizziness and headaches.   Psychiatric/Behavioral: Negative for sleep disturbance. The patient is not nervous/anxious.        Vitals:    06/07/19 0747   BP: 107/70   Pulse: 77   Temp: 97.8 °F (36.6 °C)       Objective:     Current Outpatient Medications   Medication Sig Dispense Refill    EPINEPHrine (EPIPEN) 0.3 mg/0.3 mL AtIn Inject 0.3 mLs (0.3 mg total) into the muscle once. for 1 dose 0.3 mL 0     No current facility-administered medications for this visit.        Physical Exam   Constitutional: He is oriented to person, place, and time. He appears well-developed. No distress.   HENT:   Head: Normocephalic and atraumatic.   Eyes: Pupils are equal, round, and reactive to light. EOM are normal.   Neck: Normal range of motion. Neck supple.   Cardiovascular: Normal rate and regular rhythm.    Pulmonary/Chest: Effort normal and breath sounds normal.   Musculoskeletal: Normal range of motion.   Neurological: He is alert and oriented to person, place, and time.   Skin: Skin is warm and dry. No rash noted.   Psychiatric: He has a normal mood and affect. Thought content normal.   Nursing note and vitals reviewed.      Assessment:       1. Annual physical exam        Plan:   Annual physical exam  -     Lipid panel; Future; Expected date: 06/07/2019  -     Comprehensive metabolic panel; Future; Expected date: 06/07/2019  -     CBC auto differential; Future; Expected date: 06/07/2019        No follow-ups on file.    There are no Patient Instructions on file for this visit.

## 2020-02-21 ENCOUNTER — OFFICE VISIT (OUTPATIENT)
Dept: FAMILY MEDICINE | Facility: CLINIC | Age: 45
End: 2020-02-21
Payer: COMMERCIAL

## 2020-02-21 VITALS
DIASTOLIC BLOOD PRESSURE: 72 MMHG | SYSTOLIC BLOOD PRESSURE: 123 MMHG | BODY MASS INDEX: 33.47 KG/M2 | HEART RATE: 98 BPM | WEIGHT: 233.81 LBS | TEMPERATURE: 102 F | HEIGHT: 70 IN

## 2020-02-21 DIAGNOSIS — R50.9 FEVER, UNSPECIFIED FEVER CAUSE: ICD-10-CM

## 2020-02-21 DIAGNOSIS — J11.1 INFLUENZA: Primary | ICD-10-CM

## 2020-02-21 DIAGNOSIS — R05.9 COUGH: ICD-10-CM

## 2020-02-21 DIAGNOSIS — J02.9 PHARYNGITIS, UNSPECIFIED ETIOLOGY: ICD-10-CM

## 2020-02-21 DIAGNOSIS — J03.90 TONSILLITIS WITH EXUDATE: ICD-10-CM

## 2020-02-21 LAB
INFLUENZA A, MOLECULAR: NEGATIVE
INFLUENZA B, MOLECULAR: POSITIVE
SPECIMEN SOURCE: ABNORMAL

## 2020-02-21 PROCEDURE — 99213 OFFICE O/P EST LOW 20 MIN: CPT | Mod: S$GLB,,, | Performed by: NURSE PRACTITIONER

## 2020-02-21 PROCEDURE — 99213 PR OFFICE/OUTPT VISIT, EST, LEVL III, 20-29 MIN: ICD-10-PCS | Mod: S$GLB,,, | Performed by: NURSE PRACTITIONER

## 2020-02-21 PROCEDURE — 99999 PR PBB SHADOW E&M-EST. PATIENT-LVL IV: ICD-10-PCS | Mod: PBBFAC,,, | Performed by: NURSE PRACTITIONER

## 2020-02-21 PROCEDURE — 87502 INFLUENZA DNA AMP PROBE: CPT | Mod: PO

## 2020-02-21 PROCEDURE — 99999 PR PBB SHADOW E&M-EST. PATIENT-LVL IV: CPT | Mod: PBBFAC,,, | Performed by: NURSE PRACTITIONER

## 2020-02-21 RX ORDER — AZITHROMYCIN 250 MG/1
TABLET, FILM COATED ORAL
Qty: 6 TABLET | Refills: 0 | Status: SHIPPED | OUTPATIENT
Start: 2020-02-21 | End: 2020-02-26

## 2020-02-21 RX ORDER — OSELTAMIVIR PHOSPHATE 75 MG/1
75 CAPSULE ORAL 2 TIMES DAILY
Qty: 10 CAPSULE | Refills: 0 | Status: SHIPPED | OUTPATIENT
Start: 2020-02-21 | End: 2020-02-26

## 2020-02-21 RX ORDER — PROMETHAZINE HYDROCHLORIDE AND DEXTROMETHORPHAN HYDROBROMIDE 6.25; 15 MG/5ML; MG/5ML
5 SYRUP ORAL 2 TIMES DAILY PRN
Qty: 118 ML | Refills: 0 | Status: SHIPPED | OUTPATIENT
Start: 2020-02-21 | End: 2020-03-02

## 2020-02-21 NOTE — PATIENT INSTRUCTIONS
Warm salt water gargles PRN  Motrin OTC as directed  Hydrate well  Cough medication causes drowsiness  Report to ER immediately if symptoms worsen

## 2020-02-21 NOTE — PROGRESS NOTES
Subjective:       Patient ID: Shiraz Pillai is a 44 y.o. male.    Chief Complaint: Fever; Cough; Generalized Body Aches; Nasal Congestion; and Chills    Sore Throat    This is a new problem. The current episode started in the past 7 days. The problem has been unchanged. Neither side of throat is experiencing more pain than the other. The maximum temperature recorded prior to his arrival was 102 - 102.9 F. The fever has been present for 1 to 2 days. The pain is moderate. Associated symptoms include congestion, coughing, swollen glands and trouble swallowing. Pertinent negatives include no abdominal pain, diarrhea, drooling, ear discharge, ear pain, headaches, hoarse voice, plugged ear sensation, neck pain, shortness of breath, stridor or vomiting. Associated symptoms comments: myalgias. He has had no exposure to strep or mono. Treatments tried: prednisone, prashant-seltzer cold, motrin. The treatment provided no relief.   History reviewed. No pertinent past medical history.  Social History     Socioeconomic History    Marital status:      Spouse name: Not on file    Number of children: Not on file    Years of education: Not on file    Highest education level: Not on file   Occupational History    Not on file   Social Needs    Financial resource strain: Not on file    Food insecurity:     Worry: Not on file     Inability: Not on file    Transportation needs:     Medical: Not on file     Non-medical: Not on file   Tobacco Use    Smoking status: Never Smoker    Smokeless tobacco: Never Used   Substance and Sexual Activity    Alcohol use: No    Drug use: No    Sexual activity: Not on file   Lifestyle    Physical activity:     Days per week: Not on file     Minutes per session: Not on file    Stress: Not on file   Relationships    Social connections:     Talks on phone: Not on file     Gets together: Not on file     Attends [employer] service: Not on file     Active member of club or  organization: Not on file     Attends meetings of clubs or organizations: Not on file     Relationship status: Not on file   Other Topics Concern    Not on file   Social History Narrative    Not on file     History reviewed. No pertinent surgical history.    Review of Systems   Constitutional: Negative.    HENT: Positive for congestion, rhinorrhea, sinus pressure, sore throat and trouble swallowing. Negative for drooling, ear discharge, ear pain and hoarse voice.    Eyes: Negative.    Respiratory: Positive for cough. Negative for shortness of breath, wheezing and stridor.    Cardiovascular: Negative.    Gastrointestinal: Negative.  Negative for abdominal pain, diarrhea, nausea and vomiting.   Endocrine: Negative.    Genitourinary: Negative.  Negative for dysuria.   Musculoskeletal: Negative.  Negative for neck pain.   Skin: Negative.    Allergic/Immunologic: Negative.    Neurological: Negative.  Negative for headaches.   Psychiatric/Behavioral: Negative.        Objective:      Physical Exam   Constitutional: He is oriented to person, place, and time. He appears well-developed and well-nourished.   HENT:   Head: Normocephalic.   Right Ear: Hearing, tympanic membrane, external ear and ear canal normal.   Left Ear: Hearing, tympanic membrane, external ear and ear canal normal.   Nose: Rhinorrhea present. Right sinus exhibits no maxillary sinus tenderness and no frontal sinus tenderness. Left sinus exhibits no maxillary sinus tenderness and no frontal sinus tenderness.   Mouth/Throat: Uvula is midline and mucous membranes are normal. Oropharyngeal exudate, posterior oropharyngeal edema and posterior oropharyngeal erythema present. Tonsils are 3+ on the right. Tonsillar exudate.   Eyes: Pupils are equal, round, and reactive to light. Conjunctivae are normal.   Neck: Normal range of motion. Neck supple.   Cardiovascular: Normal rate, regular rhythm and normal heart sounds.   Pulmonary/Chest: Effort normal and breath  sounds normal.   Abdominal: Soft. Bowel sounds are normal.   Musculoskeletal: Normal range of motion.   Neurological: He is alert and oriented to person, place, and time.   Skin: Skin is warm and dry. Capillary refill takes 2 to 3 seconds.   Psychiatric: He has a normal mood and affect. His behavior is normal. Judgment and thought content normal.   Nursing note and vitals reviewed.      Assessment:   1.      Influenza   2. Tonsillitis with exudate    3. Pharyngitis, unspecified etiology    4. Fever, unspecified fever cause       Plan:           Shiraz was seen today for fever, cough, generalized body aches, nasal congestion and chills.    Diagnoses and all orders for this visit:    Influenza  Tonsillitis with exudate  Pharyngitis, unspecified etiology  Fever, unspecified fever cause  Cough        -     Influenza A & B by Molecular  -     (Magic mouthwash) 1:1:1 Benadryl 12.5mg/5ml liq, aluminum & magnesium hydroxide-simehticone (Maalox), lidocaine viscous 2%; Swish and spit 5 mLs every 8 (eight) hours as needed. for mouth sores  -     oseltamivir (TAMIFLU) 75 MG capsule; Take 1 capsule (75 mg total) by mouth 2 (two) times daily. for 5 days  -     azithromycin (Z-QUAN) 250 MG tablet; Take 2 tablets by mouth on day 1; Take 1 tablet by mouth on days 2-5  -     promethazine-dextromethorphan (PROMETHAZINE-DM) 6.25-15 mg/5 mL Syrp; Take 5 mLs by mouth 2 (two) times daily as needed.  Warm salt water gargles PRN  Motrin OTC as directed  Hydrate well  Cough medication causes drowsiness  Report to ER immediately if symptoms worsen

## 2020-03-11 ENCOUNTER — TELEPHONE (OUTPATIENT)
Dept: FAMILY MEDICINE | Facility: CLINIC | Age: 45
End: 2020-03-11

## 2020-03-11 ENCOUNTER — PATIENT MESSAGE (OUTPATIENT)
Dept: FAMILY MEDICINE | Facility: CLINIC | Age: 45
End: 2020-03-11

## 2020-03-11 RX ORDER — AZITHROMYCIN 250 MG/1
TABLET, FILM COATED ORAL
Qty: 6 TABLET | Refills: 0 | Status: SHIPPED | OUTPATIENT
Start: 2020-03-11 | End: 2020-12-16

## 2020-03-11 NOTE — TELEPHONE ENCOUNTER
----- Message from Alise Díaz sent at 3/11/2020 12:25 PM CDT -----  Contact: pt wife - katty  Who Called: pt wife   Symptoms (please be specific): lingering headaches, sore throat , sinus issues   How long has patient had these symptoms:  approx 1 1/2 weeks   Pharmacy name and phone #:  [facility]  Would the patient rather a call back or a response via [facility]? Either   Best Call Back Number: [phone]  Additional Information: taking otc zyrtec and was in recently to see Mrs. Lott with flu type b    [facility] - [address] - [address] [facility]  [id number] [address].  [place] [id number]  Phone: [phone] Fax: [phone]

## 2020-03-11 NOTE — TELEPHONE ENCOUNTER
Called no answer. VM not set up.    Mom states patient is complaining of a lump in her vaginal area with discomfort. Mom wants a call back

## 2020-12-15 NOTE — PROGRESS NOTES
Subjective:       Patient ID: Shiraz Pillai is a 45 y.o. male.    Chief Complaint: Nail Problem    He comes in for routine annual wellness with fasting labs.  He has no chronic medical problems, he is on no long-term medications.  He denies tobacco alcohol or drug use.  He is due for routine fasting labs including screening for hepatitis C in screening for HIV.  He is due for Tdap.  He denies shortness of breath or chest pain, no dizziness or syncope, no headaches or blurry vision.    Rash  This is a chronic problem. The current episode started more than 1 year ago. The problem has been waxing and waning since onset. The affected locations include the left toes and right toes. The rash is characterized by itchiness, redness and burning. Pertinent negatives include no cough, diarrhea, eye pain, fatigue, fever, shortness of breath, sore throat or vomiting. Past treatments include topical steroids and anti-itch cream. The treatment provided no relief.         Review of Systems   Constitutional: Negative for fatigue, fever and unexpected weight change.   HENT: Negative for ear pain and sore throat.    Eyes: Negative.  Negative for pain and visual disturbance.   Respiratory: Negative for cough and shortness of breath.    Cardiovascular: Negative for chest pain and palpitations.   Gastrointestinal: Negative for abdominal pain, diarrhea, nausea and vomiting.   Genitourinary: Negative for dysuria and frequency.   Musculoskeletal: Negative for arthralgias and myalgias.   Skin: Negative for color change and rash.   Neurological: Negative for dizziness and headaches.   Psychiatric/Behavioral: Negative for sleep disturbance. The patient is not nervous/anxious.        Vitals:    12/16/20 0744   BP: 121/76   Pulse: 84   Temp: 98.3 °F (36.8 °C)       Objective:     Current Outpatient Medications   Medication Sig Dispense Refill    fluconazole (DIFLUCAN) 150 MG Tab Take 1 tablet (150 mg total) by mouth once a week.  for 10 doses 10 tablet 0     No current facility-administered medications for this visit.        Physical Exam  Vitals signs and nursing note reviewed.   Constitutional:       General: He is not in acute distress.     Appearance: He is well-developed.   HENT:      Head: Normocephalic and atraumatic.   Eyes:      Pupils: Pupils are equal, round, and reactive to light.   Neck:      Musculoskeletal: Normal range of motion and neck supple.   Cardiovascular:      Rate and Rhythm: Normal rate and regular rhythm.   Pulmonary:      Effort: Pulmonary effort is normal.      Breath sounds: Normal breath sounds.   Musculoskeletal: Normal range of motion.   Feet:      Right foot:      Skin integrity: Skin breakdown and erythema present.      Left foot:      Skin integrity: Erythema present.   Skin:     General: Skin is warm and dry.      Findings: No rash.   Neurological:      Mental Status: He is alert and oriented to person, place, and time.   Psychiatric:         Thought Content: Thought content normal.         Assessment:       1. Annual physical exam    2. Tinea pedis of both feet    3. Encounter for hepatitis C screening test for low risk patient    4. Screening for HIV without presence of risk factors    5. Immunization due        Plan:   Annual physical exam  -     Lipid Panel; Future; Expected date: 12/16/2020  -     Comprehensive Metabolic Panel; Future; Expected date: 12/16/2020  -     TSH; Future; Expected date: 12/16/2020    Tinea pedis of both feet  -     Lipid Panel; Future; Expected date: 12/16/2020  -     Comprehensive Metabolic Panel; Future; Expected date: 12/16/2020  -     TSH; Future; Expected date: 12/16/2020    Encounter for hepatitis C screening test for low risk patient  -     Hepatitis C Antibody; Future; Expected date: 12/16/2020    Screening for HIV without presence of risk factors  -     HIV 1/2 Ag/Ab (4th Gen); Future; Expected date: 12/16/2020    Immunization due  -     (In Office Administered)  Tdap Vaccine    Other orders  -     fluconazole (DIFLUCAN) 150 MG Tab; Take 1 tablet (150 mg total) by mouth once a week. for 10 doses  Dispense: 10 tablet; Refill: 0        No follow-ups on file.    There are no Patient Instructions on file for this visit.

## 2020-12-16 ENCOUNTER — LAB VISIT (OUTPATIENT)
Dept: LAB | Facility: HOSPITAL | Age: 45
End: 2020-12-16
Attending: NURSE PRACTITIONER
Payer: COMMERCIAL

## 2020-12-16 ENCOUNTER — OFFICE VISIT (OUTPATIENT)
Dept: FAMILY MEDICINE | Facility: CLINIC | Age: 45
End: 2020-12-16
Payer: COMMERCIAL

## 2020-12-16 VITALS
HEART RATE: 84 BPM | DIASTOLIC BLOOD PRESSURE: 76 MMHG | BODY MASS INDEX: 34.07 KG/M2 | WEIGHT: 238 LBS | TEMPERATURE: 98 F | HEIGHT: 70 IN | SYSTOLIC BLOOD PRESSURE: 121 MMHG

## 2020-12-16 DIAGNOSIS — Z23 IMMUNIZATION DUE: ICD-10-CM

## 2020-12-16 DIAGNOSIS — Z11.59 ENCOUNTER FOR HEPATITIS C SCREENING TEST FOR LOW RISK PATIENT: ICD-10-CM

## 2020-12-16 DIAGNOSIS — Z00.00 ANNUAL PHYSICAL EXAM: ICD-10-CM

## 2020-12-16 DIAGNOSIS — B35.3 TINEA PEDIS OF BOTH FEET: ICD-10-CM

## 2020-12-16 DIAGNOSIS — Z11.4 SCREENING FOR HIV WITHOUT PRESENCE OF RISK FACTORS: ICD-10-CM

## 2020-12-16 LAB
ALBUMIN SERPL BCP-MCNC: 4 G/DL (ref 3.5–5.2)
ALP SERPL-CCNC: 78 U/L (ref 55–135)
ALT SERPL W/O P-5'-P-CCNC: 31 U/L (ref 10–44)
ANION GAP SERPL CALC-SCNC: 12 MMOL/L (ref 8–16)
AST SERPL-CCNC: 21 U/L (ref 10–40)
BILIRUB SERPL-MCNC: 0.6 MG/DL (ref 0.1–1)
BUN SERPL-MCNC: 12 MG/DL (ref 6–20)
CALCIUM SERPL-MCNC: 9.5 MG/DL (ref 8.7–10.5)
CHLORIDE SERPL-SCNC: 102 MMOL/L (ref 95–110)
CHOLEST SERPL-MCNC: 167 MG/DL (ref 120–199)
CHOLEST/HDLC SERPL: 4.1 {RATIO} (ref 2–5)
CO2 SERPL-SCNC: 25 MMOL/L (ref 23–29)
CREAT SERPL-MCNC: 1 MG/DL (ref 0.5–1.4)
EST. GFR  (AFRICAN AMERICAN): >60 ML/MIN/1.73 M^2
EST. GFR  (NON AFRICAN AMERICAN): >60 ML/MIN/1.73 M^2
GLUCOSE SERPL-MCNC: 94 MG/DL (ref 70–110)
HDLC SERPL-MCNC: 41 MG/DL (ref 40–75)
HDLC SERPL: 24.6 % (ref 20–50)
LDLC SERPL CALC-MCNC: 97.6 MG/DL (ref 63–159)
NONHDLC SERPL-MCNC: 126 MG/DL
POTASSIUM SERPL-SCNC: 4.9 MMOL/L (ref 3.5–5.1)
PROT SERPL-MCNC: 7.8 G/DL (ref 6–8.4)
SODIUM SERPL-SCNC: 139 MMOL/L (ref 136–145)
TRIGL SERPL-MCNC: 142 MG/DL (ref 30–150)
TSH SERPL DL<=0.005 MIU/L-ACNC: 2.35 UIU/ML (ref 0.4–4)

## 2020-12-16 PROCEDURE — 86803 HEPATITIS C AB TEST: CPT

## 2020-12-16 PROCEDURE — 84443 ASSAY THYROID STIM HORMONE: CPT

## 2020-12-16 PROCEDURE — 90715 TDAP VACCINE GREATER THAN OR EQUAL TO 7YO IM: ICD-10-PCS | Mod: S$GLB,,, | Performed by: NURSE PRACTITIONER

## 2020-12-16 PROCEDURE — 99396 PREV VISIT EST AGE 40-64: CPT | Mod: 25,S$GLB,, | Performed by: NURSE PRACTITIONER

## 2020-12-16 PROCEDURE — 86703 HIV-1/HIV-2 1 RESULT ANTBDY: CPT

## 2020-12-16 PROCEDURE — 3008F PR BODY MASS INDEX (BMI) DOCUMENTED: ICD-10-PCS | Mod: CPTII,S$GLB,, | Performed by: NURSE PRACTITIONER

## 2020-12-16 PROCEDURE — 3008F BODY MASS INDEX DOCD: CPT | Mod: CPTII,S$GLB,, | Performed by: NURSE PRACTITIONER

## 2020-12-16 PROCEDURE — 90715 TDAP VACCINE 7 YRS/> IM: CPT | Mod: S$GLB,,, | Performed by: NURSE PRACTITIONER

## 2020-12-16 PROCEDURE — 90471 TDAP VACCINE GREATER THAN OR EQUAL TO 7YO IM: ICD-10-PCS | Mod: S$GLB,,, | Performed by: NURSE PRACTITIONER

## 2020-12-16 PROCEDURE — 80061 LIPID PANEL: CPT

## 2020-12-16 PROCEDURE — 99999 PR PBB SHADOW E&M-EST. PATIENT-LVL III: CPT | Mod: PBBFAC,,, | Performed by: NURSE PRACTITIONER

## 2020-12-16 PROCEDURE — 1126F AMNT PAIN NOTED NONE PRSNT: CPT | Mod: S$GLB,,, | Performed by: NURSE PRACTITIONER

## 2020-12-16 PROCEDURE — 99999 PR PBB SHADOW E&M-EST. PATIENT-LVL III: ICD-10-PCS | Mod: PBBFAC,,, | Performed by: NURSE PRACTITIONER

## 2020-12-16 PROCEDURE — 1126F PR PAIN SEVERITY QUANTIFIED, NO PAIN PRESENT: ICD-10-PCS | Mod: S$GLB,,, | Performed by: NURSE PRACTITIONER

## 2020-12-16 PROCEDURE — 80053 COMPREHEN METABOLIC PANEL: CPT

## 2020-12-16 PROCEDURE — 90471 IMMUNIZATION ADMIN: CPT | Mod: S$GLB,,, | Performed by: NURSE PRACTITIONER

## 2020-12-16 PROCEDURE — 99396 PR PREVENTIVE VISIT,EST,40-64: ICD-10-PCS | Mod: 25,S$GLB,, | Performed by: NURSE PRACTITIONER

## 2020-12-16 PROCEDURE — 36415 COLL VENOUS BLD VENIPUNCTURE: CPT | Mod: PO

## 2020-12-16 RX ORDER — FLUCONAZOLE 150 MG/1
150 TABLET ORAL WEEKLY
Qty: 10 TABLET | Refills: 0 | Status: SHIPPED | OUTPATIENT
Start: 2020-12-16 | End: 2021-02-18

## 2020-12-17 LAB
HCV AB SERPL QL IA: NEGATIVE
HIV 1+2 AB+HIV1 P24 AG SERPL QL IA: NEGATIVE

## 2020-12-31 PROBLEM — N20.1 LEFT URETERAL CALCULUS: Status: ACTIVE | Noted: 2019-10-02

## 2020-12-31 PROBLEM — N20.0 CALCIUM NEPHROLITHIASIS: Status: ACTIVE | Noted: 2020-03-22

## 2021-04-29 ENCOUNTER — PATIENT MESSAGE (OUTPATIENT)
Dept: RESEARCH | Facility: HOSPITAL | Age: 46
End: 2021-04-29

## 2021-08-02 ENCOUNTER — TELEPHONE (OUTPATIENT)
Dept: FAMILY MEDICINE | Facility: CLINIC | Age: 46
End: 2021-08-02

## 2021-08-02 ENCOUNTER — CLINICAL SUPPORT (OUTPATIENT)
Dept: FAMILY MEDICINE | Facility: CLINIC | Age: 46
End: 2021-08-02
Payer: COMMERCIAL

## 2021-08-02 DIAGNOSIS — R06.02 SHORTNESS OF BREATH: ICD-10-CM

## 2021-08-02 DIAGNOSIS — R05.9 COUGH: ICD-10-CM

## 2021-08-02 DIAGNOSIS — R50.9 FEVER: ICD-10-CM

## 2021-08-02 PROCEDURE — U0005 INFEC AGEN DETEC AMPLI PROBE: HCPCS | Performed by: FAMILY MEDICINE

## 2021-08-02 PROCEDURE — U0003 INFECTIOUS AGENT DETECTION BY NUCLEIC ACID (DNA OR RNA); SEVERE ACUTE RESPIRATORY SYNDROME CORONAVIRUS 2 (SARS-COV-2) (CORONAVIRUS DISEASE [COVID-19]), AMPLIFIED PROBE TECHNIQUE, MAKING USE OF HIGH THROUGHPUT TECHNOLOGIES AS DESCRIBED BY CMS-2020-01-R: HCPCS | Performed by: FAMILY MEDICINE

## 2021-08-03 DIAGNOSIS — U07.1 COVID-19 VIRUS DETECTED: ICD-10-CM

## 2021-08-03 LAB
SARS-COV-2 RNA RESP QL NAA+PROBE: DETECTED
SARS-COV-2- CYCLE NUMBER: 13.58

## 2021-08-04 DIAGNOSIS — U07.1 LAB TEST POSITIVE FOR DETECTION OF COVID-19 VIRUS: Primary | ICD-10-CM

## 2022-03-18 ENCOUNTER — PATIENT MESSAGE (OUTPATIENT)
Dept: ADMINISTRATIVE | Facility: HOSPITAL | Age: 47
End: 2022-03-18
Payer: COMMERCIAL

## 2022-07-08 ENCOUNTER — PATIENT MESSAGE (OUTPATIENT)
Dept: FAMILY MEDICINE | Facility: CLINIC | Age: 47
End: 2022-07-08
Payer: COMMERCIAL

## 2022-07-25 ENCOUNTER — TELEPHONE (OUTPATIENT)
Dept: FAMILY MEDICINE | Facility: CLINIC | Age: 47
End: 2022-07-25
Payer: COMMERCIAL

## 2022-07-25 NOTE — TELEPHONE ENCOUNTER
----- Message from Aishwarya Simmons sent at 7/25/2022  1:54 PM CDT -----  Contact: Becky spouse [phone] or [phone]  1[employer]     Patient is calling for Medical Advice regarding:    How long has patient had these symptoms:    Pharmacy name and phone#:    Would like response via [employer]:call back    Comments: Pt's spouse is requesting a call back from the nurse to see if pt can get in today for ear pain

## 2022-07-26 ENCOUNTER — OFFICE VISIT (OUTPATIENT)
Dept: FAMILY MEDICINE | Facility: CLINIC | Age: 47
End: 2022-07-26
Payer: COMMERCIAL

## 2022-07-26 VITALS
DIASTOLIC BLOOD PRESSURE: 70 MMHG | BODY MASS INDEX: 33.93 KG/M2 | OXYGEN SATURATION: 98 % | HEART RATE: 83 BPM | WEIGHT: 237 LBS | HEIGHT: 70 IN | SYSTOLIC BLOOD PRESSURE: 118 MMHG

## 2022-07-26 DIAGNOSIS — H92.02 ACUTE OTALGIA, LEFT: Primary | ICD-10-CM

## 2022-07-26 PROCEDURE — 3008F BODY MASS INDEX DOCD: CPT | Mod: CPTII,S$GLB,, | Performed by: NURSE PRACTITIONER

## 2022-07-26 PROCEDURE — 99213 OFFICE O/P EST LOW 20 MIN: CPT | Mod: S$GLB,,, | Performed by: NURSE PRACTITIONER

## 2022-07-26 PROCEDURE — 3008F PR BODY MASS INDEX (BMI) DOCUMENTED: ICD-10-PCS | Mod: CPTII,S$GLB,, | Performed by: NURSE PRACTITIONER

## 2022-07-26 PROCEDURE — 3078F DIAST BP <80 MM HG: CPT | Mod: CPTII,S$GLB,, | Performed by: NURSE PRACTITIONER

## 2022-07-26 PROCEDURE — 1159F MED LIST DOCD IN RCRD: CPT | Mod: CPTII,S$GLB,, | Performed by: NURSE PRACTITIONER

## 2022-07-26 PROCEDURE — 99999 PR PBB SHADOW E&M-EST. PATIENT-LVL IV: ICD-10-PCS | Mod: PBBFAC,,, | Performed by: NURSE PRACTITIONER

## 2022-07-26 PROCEDURE — 3078F PR MOST RECENT DIASTOLIC BLOOD PRESSURE < 80 MM HG: ICD-10-PCS | Mod: CPTII,S$GLB,, | Performed by: NURSE PRACTITIONER

## 2022-07-26 PROCEDURE — 3074F PR MOST RECENT SYSTOLIC BLOOD PRESSURE < 130 MM HG: ICD-10-PCS | Mod: CPTII,S$GLB,, | Performed by: NURSE PRACTITIONER

## 2022-07-26 PROCEDURE — 99213 PR OFFICE/OUTPT VISIT, EST, LEVL III, 20-29 MIN: ICD-10-PCS | Mod: S$GLB,,, | Performed by: NURSE PRACTITIONER

## 2022-07-26 PROCEDURE — 1160F RVW MEDS BY RX/DR IN RCRD: CPT | Mod: CPTII,S$GLB,, | Performed by: NURSE PRACTITIONER

## 2022-07-26 PROCEDURE — 99999 PR PBB SHADOW E&M-EST. PATIENT-LVL IV: CPT | Mod: PBBFAC,,, | Performed by: NURSE PRACTITIONER

## 2022-07-26 PROCEDURE — 1160F PR REVIEW ALL MEDS BY PRESCRIBER/CLIN PHARMACIST DOCUMENTED: ICD-10-PCS | Mod: CPTII,S$GLB,, | Performed by: NURSE PRACTITIONER

## 2022-07-26 PROCEDURE — 3074F SYST BP LT 130 MM HG: CPT | Mod: CPTII,S$GLB,, | Performed by: NURSE PRACTITIONER

## 2022-07-26 PROCEDURE — 1159F PR MEDICATION LIST DOCUMENTED IN MEDICAL RECORD: ICD-10-PCS | Mod: CPTII,S$GLB,, | Performed by: NURSE PRACTITIONER

## 2022-07-26 RX ORDER — AMOXICILLIN AND CLAVULANATE POTASSIUM 875; 125 MG/1; MG/1
1 TABLET, FILM COATED ORAL 2 TIMES DAILY
COMMUNITY
Start: 2022-07-17 | End: 2022-07-26

## 2022-07-26 RX ORDER — OFLOXACIN 3 MG/ML
5 SOLUTION AURICULAR (OTIC) DAILY
Qty: 10 ML | Refills: 0 | Status: SHIPPED | OUTPATIENT
Start: 2022-07-26 | End: 2023-03-03

## 2022-07-26 RX ORDER — OFLOXACIN 3 MG/ML
5 SOLUTION AURICULAR (OTIC) DAILY
Qty: 10 ML | Refills: 0 | Status: CANCELLED | OUTPATIENT
Start: 2022-07-26

## 2022-07-26 NOTE — PROGRESS NOTES
Assessment/Plan:  Problem List Items Addressed This Visit    None     Visit Diagnoses     Acute otalgia, left    -  Primary    Relevant Medications    ofloxacin (FLOXIN) 0.3 % otic solution    Other Relevant Orders    Ambulatory referral/consult to ENT      Recommend evaluation by ENT- referral placed   Follow up if symptoms worsen or fail to improve.  ER precautions for severe or worsening symptoms.     Nicol Marquez, YELITZA  _____________________________________________________________________________________________________________________________________________________    CC: ear pain    HPI: Patient is a 46-year-old male who presents in clinic today as an established patient here for ear pain. He reports going to urgent care over a week ago with ear pain. He states that he was told left ear drum is perforated. He was given Augmentin, which he has completed as prescribed and states was instructed to follow up with primary care. There has been no drainage or fever. He reports pain has improved. He does complain of intermittent tinnitus. He reports no ear problems in the past. There has been no recent injury. No known loud noise exposure, however, he does work in construction.     Past Medical History:  History reviewed. No pertinent past medical history.  History reviewed. No pertinent surgical history.  Review of patient's allergies indicates:  No Known Allergies  Social History     Tobacco Use    Smoking status: Never Smoker    Smokeless tobacco: Never Used   Substance Use Topics    Alcohol use: No    Drug use: No     History reviewed. No pertinent family history.  Current Outpatient Medications on File Prior to Visit   Medication Sig Dispense Refill    [DISCONTINUED] amoxicillin-clavulanate 875-125mg (AUGMENTIN) 875-125 mg per tablet Take 1 tablet by mouth 2 (two) times daily.       No current facility-administered medications on file prior to visit.     Review of Systems   Constitutional: Negative for  "activity change, appetite change, chills, fatigue and fever.   HENT: Positive for ear pain (improving) and tinnitus. Negative for congestion, rhinorrhea and sore throat.    Eyes: Negative for visual disturbance.   Respiratory: Negative for cough and shortness of breath.    Cardiovascular: Negative for chest pain, palpitations and leg swelling.   Gastrointestinal: Negative for abdominal pain, diarrhea and vomiting.   Musculoskeletal: Negative for arthralgias and myalgias.   Skin: Negative for rash.   Neurological: Negative for dizziness and headaches.   Psychiatric/Behavioral: Negative for behavioral problems. The patient is not nervous/anxious.      Vitals:    07/26/22 1559   BP: 118/70   BP Location: Right arm   Pulse: 83   SpO2: 98%   Weight: 107.5 kg (237 lb)   Height: 5' 10" (1.778 m)     Wt Readings from Last 3 Encounters:   07/26/22 107.5 kg (237 lb)   12/16/20 108 kg (238 lb)   02/21/20 106.1 kg (233 lb 12.8 oz)     Physical Exam  Vitals reviewed.   Constitutional:       General: He is not in acute distress.     Appearance: Normal appearance. He is not ill-appearing.   HENT:      Head: Normocephalic and atraumatic.      Right Ear: Tympanic membrane and external ear normal.      Left Ear: External ear normal.      Ears:      Comments: Unable to fully visualize left TM due to cerumen and mild swelling   Eyes:      Extraocular Movements: Extraocular movements intact.      Conjunctiva/sclera: Conjunctivae normal.   Cardiovascular:      Rate and Rhythm: Normal rate.      Heart sounds: Normal heart sounds.   Pulmonary:      Effort: Pulmonary effort is normal. No respiratory distress.      Breath sounds: Normal breath sounds.   Abdominal:      General: Abdomen is flat. There is no distension.   Musculoskeletal:         General: Normal range of motion.      Cervical back: Normal range of motion.   Skin:     General: Skin is warm and dry.      Coloration: Skin is not pale.      Findings: No rash.   Neurological:      " Mental Status: He is alert and oriented to person, place, and time. Mental status is at baseline.   Psychiatric:         Mood and Affect: Mood normal.         Speech: Speech normal.         Behavior: Behavior normal. Behavior is cooperative.       Health Maintenance   Topic Date Due    Lipid Panel  12/16/2021    TETANUS VACCINE  12/16/2030    Hepatitis C Screening  Completed

## 2022-12-20 DIAGNOSIS — Z12.31 OTHER SCREENING MAMMOGRAM: ICD-10-CM

## 2022-12-23 ENCOUNTER — PATIENT MESSAGE (OUTPATIENT)
Dept: FAMILY MEDICINE | Facility: CLINIC | Age: 47
End: 2022-12-23
Payer: COMMERCIAL

## 2023-03-03 ENCOUNTER — OFFICE VISIT (OUTPATIENT)
Dept: FAMILY MEDICINE | Facility: CLINIC | Age: 48
End: 2023-03-03
Payer: COMMERCIAL

## 2023-03-03 VITALS
TEMPERATURE: 97 F | SYSTOLIC BLOOD PRESSURE: 120 MMHG | HEART RATE: 77 BPM | WEIGHT: 229 LBS | BODY MASS INDEX: 33.92 KG/M2 | OXYGEN SATURATION: 97 % | HEIGHT: 69 IN | DIASTOLIC BLOOD PRESSURE: 80 MMHG

## 2023-03-03 DIAGNOSIS — S59.901A ELBOW INJURY, RIGHT, INITIAL ENCOUNTER: Primary | ICD-10-CM

## 2023-03-03 PROCEDURE — 3008F BODY MASS INDEX DOCD: CPT | Mod: CPTII,S$GLB,, | Performed by: NURSE PRACTITIONER

## 2023-03-03 PROCEDURE — 3008F PR BODY MASS INDEX (BMI) DOCUMENTED: ICD-10-PCS | Mod: CPTII,S$GLB,, | Performed by: NURSE PRACTITIONER

## 2023-03-03 PROCEDURE — 3074F SYST BP LT 130 MM HG: CPT | Mod: CPTII,S$GLB,, | Performed by: NURSE PRACTITIONER

## 2023-03-03 PROCEDURE — 3074F PR MOST RECENT SYSTOLIC BLOOD PRESSURE < 130 MM HG: ICD-10-PCS | Mod: CPTII,S$GLB,, | Performed by: NURSE PRACTITIONER

## 2023-03-03 PROCEDURE — 99999 PR PBB SHADOW E&M-EST. PATIENT-LVL IV: CPT | Mod: PBBFAC,,, | Performed by: NURSE PRACTITIONER

## 2023-03-03 PROCEDURE — 3079F PR MOST RECENT DIASTOLIC BLOOD PRESSURE 80-89 MM HG: ICD-10-PCS | Mod: CPTII,S$GLB,, | Performed by: NURSE PRACTITIONER

## 2023-03-03 PROCEDURE — 99999 PR PBB SHADOW E&M-EST. PATIENT-LVL IV: ICD-10-PCS | Mod: PBBFAC,,, | Performed by: NURSE PRACTITIONER

## 2023-03-03 PROCEDURE — 99213 OFFICE O/P EST LOW 20 MIN: CPT | Mod: S$GLB,,, | Performed by: NURSE PRACTITIONER

## 2023-03-03 PROCEDURE — 99213 PR OFFICE/OUTPT VISIT, EST, LEVL III, 20-29 MIN: ICD-10-PCS | Mod: S$GLB,,, | Performed by: NURSE PRACTITIONER

## 2023-03-03 PROCEDURE — 3079F DIAST BP 80-89 MM HG: CPT | Mod: CPTII,S$GLB,, | Performed by: NURSE PRACTITIONER

## 2023-03-03 RX ORDER — PREDNISONE 20 MG/1
20 TABLET ORAL 2 TIMES DAILY
Qty: 10 TABLET | Refills: 0 | Status: SHIPPED | OUTPATIENT
Start: 2023-03-03 | End: 2023-03-08

## 2023-03-03 RX ORDER — METHOCARBAMOL 500 MG/1
500 TABLET, FILM COATED ORAL 2 TIMES DAILY PRN
Qty: 20 TABLET | Refills: 0 | Status: SHIPPED | OUTPATIENT
Start: 2023-03-03 | End: 2023-03-13

## 2023-03-03 RX ORDER — SULINDAC 200 MG/1
200 TABLET ORAL 2 TIMES DAILY
Qty: 14 TABLET | Refills: 0 | Status: SHIPPED | OUTPATIENT
Start: 2023-03-03 | End: 2023-03-10

## 2023-03-03 NOTE — PROGRESS NOTES
Subjective:       Patient ID: Shiraz Pillai is a 47 y.o. male.    Chief Complaint: Arm Pain (Right elbow)    Elbow Injury  This is a new problem. The current episode started 1 to 4 weeks ago. The problem occurs constantly. The problem has been unchanged. Associated symptoms include arthralgias and myalgias. Pertinent negatives include no abdominal pain, chest pain, coughing, fatigue, fever, headaches, nausea, rash, sore throat or vomiting. The symptoms are aggravated by bending (lifting, pressure to elbow). He has tried nothing for the symptoms.     Review of Systems   Constitutional:  Negative for fatigue, fever and unexpected weight change.   HENT:  Negative for ear pain and sore throat.    Eyes: Negative.  Negative for pain and visual disturbance.   Respiratory:  Negative for cough and shortness of breath.    Cardiovascular:  Negative for chest pain and palpitations.   Gastrointestinal:  Negative for abdominal pain, diarrhea, nausea and vomiting.   Genitourinary:  Negative for dysuria and frequency.   Musculoskeletal:  Positive for arthralgias and myalgias.   Skin:  Negative for color change and rash.   Neurological:  Negative for dizziness and headaches.   Psychiatric/Behavioral:  Negative for sleep disturbance. The patient is not nervous/anxious.      Vitals:    03/03/23 0902   BP: 120/80   Pulse: 77   Temp: 97.4 °F (36.3 °C)       Objective:     Current Outpatient Medications   Medication Sig Dispense Refill    methocarbamoL (ROBAXIN) 500 MG Tab Take 1 tablet (500 mg total) by mouth 2 (two) times daily as needed (muscle pain). 20 tablet 0    predniSONE (DELTASONE) 20 MG tablet Take 1 tablet (20 mg total) by mouth 2 (two) times daily. for 5 days 10 tablet 0    sulindac (CLINORIL) 200 MG Tab Take 1 tablet (200 mg total) by mouth 2 (two) times daily. for 7 days 14 tablet 0     No current facility-administered medications for this visit.       Physical Exam  Vitals and nursing note reviewed.    Constitutional:       General: He is not in acute distress.     Appearance: He is well-developed.   HENT:      Head: Normocephalic and atraumatic.   Eyes:      Pupils: Pupils are equal, round, and reactive to light.   Cardiovascular:      Rate and Rhythm: Normal rate and regular rhythm.   Pulmonary:      Effort: Pulmonary effort is normal.      Breath sounds: Normal breath sounds.   Musculoskeletal:      Right elbow: Decreased range of motion. Tenderness present.        Arms:       Cervical back: Normal range of motion and neck supple.   Skin:     General: Skin is warm and dry.      Findings: No rash.   Neurological:      Mental Status: He is alert and oriented to person, place, and time.   Psychiatric:         Thought Content: Thought content normal.       Assessment:       1. Elbow injury, right, initial encounter        Plan:   Elbow injury, right, initial encounter    Other orders  -     predniSONE (DELTASONE) 20 MG tablet; Take 1 tablet (20 mg total) by mouth 2 (two) times daily. for 5 days  Dispense: 10 tablet; Refill: 0  -     sulindac (CLINORIL) 200 MG Tab; Take 1 tablet (200 mg total) by mouth 2 (two) times daily. for 7 days  Dispense: 14 tablet; Refill: 0  -     methocarbamoL (ROBAXIN) 500 MG Tab; Take 1 tablet (500 mg total) by mouth 2 (two) times daily as needed (muscle pain).  Dispense: 20 tablet; Refill: 0        No follow-ups on file.    There are no Patient Instructions on file for this visit.

## 2023-03-17 ENCOUNTER — OFFICE VISIT (OUTPATIENT)
Dept: FAMILY MEDICINE | Facility: CLINIC | Age: 48
End: 2023-03-17
Payer: COMMERCIAL

## 2023-03-17 VITALS
TEMPERATURE: 98 F | HEART RATE: 83 BPM | DIASTOLIC BLOOD PRESSURE: 78 MMHG | OXYGEN SATURATION: 99 % | WEIGHT: 222.88 LBS | BODY MASS INDEX: 33.01 KG/M2 | SYSTOLIC BLOOD PRESSURE: 116 MMHG | HEIGHT: 69 IN

## 2023-03-17 DIAGNOSIS — M25.521 ELBOW PAIN, CHRONIC, RIGHT: Primary | ICD-10-CM

## 2023-03-17 DIAGNOSIS — G89.29 ELBOW PAIN, CHRONIC, RIGHT: Primary | ICD-10-CM

## 2023-03-17 PROCEDURE — 3074F SYST BP LT 130 MM HG: CPT | Mod: CPTII,S$GLB,, | Performed by: NURSE PRACTITIONER

## 2023-03-17 PROCEDURE — 3078F PR MOST RECENT DIASTOLIC BLOOD PRESSURE < 80 MM HG: ICD-10-PCS | Mod: CPTII,S$GLB,, | Performed by: NURSE PRACTITIONER

## 2023-03-17 PROCEDURE — 3008F PR BODY MASS INDEX (BMI) DOCUMENTED: ICD-10-PCS | Mod: CPTII,S$GLB,, | Performed by: NURSE PRACTITIONER

## 2023-03-17 PROCEDURE — 1160F RVW MEDS BY RX/DR IN RCRD: CPT | Mod: CPTII,S$GLB,, | Performed by: NURSE PRACTITIONER

## 2023-03-17 PROCEDURE — 99213 OFFICE O/P EST LOW 20 MIN: CPT | Mod: S$GLB,,, | Performed by: NURSE PRACTITIONER

## 2023-03-17 PROCEDURE — 3078F DIAST BP <80 MM HG: CPT | Mod: CPTII,S$GLB,, | Performed by: NURSE PRACTITIONER

## 2023-03-17 PROCEDURE — 99213 PR OFFICE/OUTPT VISIT, EST, LEVL III, 20-29 MIN: ICD-10-PCS | Mod: S$GLB,,, | Performed by: NURSE PRACTITIONER

## 2023-03-17 PROCEDURE — 1160F PR REVIEW ALL MEDS BY PRESCRIBER/CLIN PHARMACIST DOCUMENTED: ICD-10-PCS | Mod: CPTII,S$GLB,, | Performed by: NURSE PRACTITIONER

## 2023-03-17 PROCEDURE — 3074F PR MOST RECENT SYSTOLIC BLOOD PRESSURE < 130 MM HG: ICD-10-PCS | Mod: CPTII,S$GLB,, | Performed by: NURSE PRACTITIONER

## 2023-03-17 PROCEDURE — 3008F BODY MASS INDEX DOCD: CPT | Mod: CPTII,S$GLB,, | Performed by: NURSE PRACTITIONER

## 2023-03-17 PROCEDURE — 99999 PR PBB SHADOW E&M-EST. PATIENT-LVL IV: CPT | Mod: PBBFAC,,, | Performed by: NURSE PRACTITIONER

## 2023-03-17 PROCEDURE — 99999 PR PBB SHADOW E&M-EST. PATIENT-LVL IV: ICD-10-PCS | Mod: PBBFAC,,, | Performed by: NURSE PRACTITIONER

## 2023-03-17 PROCEDURE — 1159F PR MEDICATION LIST DOCUMENTED IN MEDICAL RECORD: ICD-10-PCS | Mod: CPTII,S$GLB,, | Performed by: NURSE PRACTITIONER

## 2023-03-17 PROCEDURE — 1159F MED LIST DOCD IN RCRD: CPT | Mod: CPTII,S$GLB,, | Performed by: NURSE PRACTITIONER

## 2023-03-17 NOTE — PROGRESS NOTES
Subjective:       Patient ID: Shiraz Pillai is a 47 y.o. male.    Chief Complaint: Arm Pain    Arm Pain   Incident onset: 4-5 weeks. There was no injury mechanism. The pain is present in the right elbow and upper right arm. The quality of the pain is described as aching. The pain does not radiate. The pain is moderate. The pain has been Fluctuating since the incident. Associated symptoms include muscle weakness. Pertinent negatives include no chest pain. The symptoms are aggravated by movement and lifting. He has tried acetaminophen, ice and NSAIDs (steroids, muscle relaxers) for the symptoms. The treatment provided no relief.     Review of Systems   Constitutional:  Negative for fatigue, fever and unexpected weight change.   HENT:  Negative for ear pain and sore throat.    Eyes: Negative.  Negative for pain and visual disturbance.   Respiratory:  Negative for cough and shortness of breath.    Cardiovascular:  Negative for chest pain and palpitations.   Gastrointestinal:  Negative for abdominal pain, diarrhea, nausea and vomiting.   Genitourinary:  Negative for dysuria and frequency.   Musculoskeletal:  Negative for arthralgias and myalgias.   Skin:  Negative for color change and rash.   Neurological:  Negative for dizziness and headaches.   Psychiatric/Behavioral:  Negative for sleep disturbance. The patient is not nervous/anxious.      Vitals:    03/17/23 0801   BP: 116/78   Pulse: 83   Temp: 98 °F (36.7 °C)       Objective:     No current outpatient medications on file.     No current facility-administered medications for this visit.       Physical Exam  Vitals and nursing note reviewed.   Constitutional:       General: He is not in acute distress.     Appearance: He is well-developed.   HENT:      Head: Normocephalic and atraumatic.   Eyes:      Pupils: Pupils are equal, round, and reactive to light.   Cardiovascular:      Rate and Rhythm: Normal rate and regular rhythm.   Pulmonary:      Effort:  Pulmonary effort is normal. No respiratory distress.      Breath sounds: Normal breath sounds.   Musculoskeletal:         General: Normal range of motion.      Right upper arm: Tenderness present.      Right elbow: Tenderness present.        Arms:       Cervical back: Normal range of motion and neck supple.   Skin:     General: Skin is warm and dry.      Findings: No rash.   Neurological:      Mental Status: He is alert and oriented to person, place, and time.   Psychiatric:         Thought Content: Thought content normal.         Judgment: Judgment normal.       Assessment:       1. Elbow pain, chronic, right          Plan:   Elbow pain, chronic, right  -     Ambulatory referral/consult to Orthopedics; Future; Expected date: 03/24/2023        No follow-ups on file.    There are no Patient Instructions on file for this visit.

## 2023-03-28 ENCOUNTER — OFFICE VISIT (OUTPATIENT)
Dept: ORTHOPEDICS | Facility: CLINIC | Age: 48
End: 2023-03-28
Payer: COMMERCIAL

## 2023-03-28 ENCOUNTER — HOSPITAL ENCOUNTER (OUTPATIENT)
Dept: RADIOLOGY | Facility: HOSPITAL | Age: 48
Discharge: HOME | End: 2023-03-28
Attending: ORTHOPAEDIC SURGERY
Payer: COMMERCIAL

## 2023-03-28 VITALS — HEIGHT: 69 IN | BODY MASS INDEX: 34.07 KG/M2 | WEIGHT: 230 LBS

## 2023-03-28 DIAGNOSIS — G89.29 ELBOW PAIN, CHRONIC, RIGHT: ICD-10-CM

## 2023-03-28 DIAGNOSIS — G89.29 CHRONIC ELBOW PAIN, RIGHT: ICD-10-CM

## 2023-03-28 DIAGNOSIS — S46.911A ELBOW STRAIN, RIGHT, INITIAL ENCOUNTER: Primary | ICD-10-CM

## 2023-03-28 DIAGNOSIS — M25.521 CHRONIC ELBOW PAIN, RIGHT: ICD-10-CM

## 2023-03-28 DIAGNOSIS — M25.521 ELBOW PAIN, CHRONIC, RIGHT: ICD-10-CM

## 2023-03-28 PROCEDURE — 99999 PR PBB SHADOW E&M-EST. PATIENT-LVL III: ICD-10-PCS | Mod: PBBFAC,,, | Performed by: ORTHOPAEDIC SURGERY

## 2023-03-28 PROCEDURE — 1159F PR MEDICATION LIST DOCUMENTED IN MEDICAL RECORD: ICD-10-PCS | Mod: CPTII,S$GLB,, | Performed by: ORTHOPAEDIC SURGERY

## 2023-03-28 PROCEDURE — 73080 XR ELBOW COMPLETE 3 VIEW RIGHT: ICD-10-PCS | Mod: 26,RT,, | Performed by: RADIOLOGY

## 2023-03-28 PROCEDURE — 3008F PR BODY MASS INDEX (BMI) DOCUMENTED: ICD-10-PCS | Mod: CPTII,S$GLB,, | Performed by: ORTHOPAEDIC SURGERY

## 2023-03-28 PROCEDURE — 99204 OFFICE O/P NEW MOD 45 MIN: CPT | Mod: S$GLB,,, | Performed by: ORTHOPAEDIC SURGERY

## 2023-03-28 PROCEDURE — 1160F PR REVIEW ALL MEDS BY PRESCRIBER/CLIN PHARMACIST DOCUMENTED: ICD-10-PCS | Mod: CPTII,S$GLB,, | Performed by: ORTHOPAEDIC SURGERY

## 2023-03-28 PROCEDURE — 3008F BODY MASS INDEX DOCD: CPT | Mod: CPTII,S$GLB,, | Performed by: ORTHOPAEDIC SURGERY

## 2023-03-28 PROCEDURE — 1159F MED LIST DOCD IN RCRD: CPT | Mod: CPTII,S$GLB,, | Performed by: ORTHOPAEDIC SURGERY

## 2023-03-28 PROCEDURE — 73080 X-RAY EXAM OF ELBOW: CPT | Mod: 26,RT,, | Performed by: RADIOLOGY

## 2023-03-28 PROCEDURE — 73080 X-RAY EXAM OF ELBOW: CPT | Mod: TC,PO,RT

## 2023-03-28 PROCEDURE — 99204 PR OFFICE/OUTPT VISIT, NEW, LEVL IV, 45-59 MIN: ICD-10-PCS | Mod: S$GLB,,, | Performed by: ORTHOPAEDIC SURGERY

## 2023-03-28 PROCEDURE — 99999 PR PBB SHADOW E&M-EST. PATIENT-LVL III: CPT | Mod: PBBFAC,,, | Performed by: ORTHOPAEDIC SURGERY

## 2023-03-28 PROCEDURE — 1160F RVW MEDS BY RX/DR IN RCRD: CPT | Mod: CPTII,S$GLB,, | Performed by: ORTHOPAEDIC SURGERY

## 2023-03-28 RX ORDER — METHOCARBAMOL 750 MG/1
750 TABLET, FILM COATED ORAL 4 TIMES DAILY PRN
Qty: 44 TABLET | Refills: 3 | Status: SHIPPED | OUTPATIENT
Start: 2023-03-28 | End: 2023-09-29

## 2023-03-28 RX ORDER — MELOXICAM 15 MG/1
15 TABLET ORAL DAILY
Qty: 30 TABLET | Refills: 11 | Status: SHIPPED | OUTPATIENT
Start: 2023-03-28 | End: 2023-09-29

## 2023-03-28 NOTE — PROGRESS NOTES
Chief Complaint   Patient presents with    Right Upper Arm - Pain       HPI:    This is a 47 y.o. who presents today complaining of right elbow pain for 1 weeks after lifting injury. Pain is dull. No numbness or tingling. No associated signs or symptoms.      History reviewed. No pertinent past medical history.   History reviewed. No pertinent surgical history.   No current outpatient medications on file prior to visit.     No current facility-administered medications on file prior to visit.      Review of patient's allergies indicates:  No Known Allergies   Family History not pertinent   Social History     Socioeconomic History    Marital status:    Tobacco Use    Smoking status: Never    Smokeless tobacco: Never   Substance and Sexual Activity    Alcohol use: No    Drug use: No         Review of Systems:   Constitutional:  Denies fever or chills    Eyes:  Denies change in visual acuity    HENT:  Denies nasal congestion or sore throat    Respiratory:  Denies cough or shortness of breath    Cardiovascular:  Denies chest pain or edema    GI:  Denies abdominal pain, nausea, vomiting, bloody stools or diarrhea    :  Denies dysuria    Integument:  Denies rash    Neurologic:  Denies headache, focal weakness or sensory changes    Endocrine:  Denies polyuria or polydipsia    Lymphatic:  Denies swollen glands    Psychiatric:  Denies depression or anxiety       Physical Exam:    Constitutional:  Well developed, well nourished, no acute distress, non-toxic appearance    Integument:  Well hydrated, no rash    Lymphatic:  No lymphadenopathy noted    Neurologic:  Alert & oriented x 3,     Psychiatric:  Speech and behavior appropriate    Gi: abdomen soft  Eyes: EOMI   L elbow  Exam performed same as contralateral side and is normal  R elbow  Decreased strength in elbow flexion. Hook test equivocal. Biceps deformity noted. Skin intact. Compartments soft. NVI distally.      X-rays were performed today, personally reviewed  by me and findings discussed with the patient.   3 views of the right elbow show no fractures or dislocations.       Elbow pain, chronic, right  -     Ambulatory referral/consult to Orthopedics    Other orders  -     methocarbamoL (ROBAXIN) 750 MG Tab; Take 1 tablet (750 mg total) by mouth 4 (four) times daily as needed.  Dispense: 44 tablet; Refill: 3  -     meloxicam (MOBIC) 15 MG tablet; Take 1 tablet (15 mg total) by mouth once daily.  Dispense: 30 tablet; Refill: 11        Will order MRI to eval the right distal biceps tendon. Will call with results.

## 2023-04-04 ENCOUNTER — PATIENT MESSAGE (OUTPATIENT)
Dept: RADIOLOGY | Facility: HOSPITAL | Age: 48
End: 2023-04-04
Payer: COMMERCIAL

## 2023-04-05 ENCOUNTER — PATIENT MESSAGE (OUTPATIENT)
Dept: ORTHOPEDICS | Facility: CLINIC | Age: 48
End: 2023-04-05
Payer: COMMERCIAL

## 2023-04-05 ENCOUNTER — HOSPITAL ENCOUNTER (OUTPATIENT)
Dept: RADIOLOGY | Facility: HOSPITAL | Age: 48
Discharge: HOME | End: 2023-04-05
Attending: ORTHOPAEDIC SURGERY
Payer: COMMERCIAL

## 2023-04-05 DIAGNOSIS — S46.911A ELBOW STRAIN, RIGHT, INITIAL ENCOUNTER: ICD-10-CM

## 2023-04-05 PROCEDURE — 73221 MRI JOINT UPR EXTREM W/O DYE: CPT | Mod: 26,RT,, | Performed by: RADIOLOGY

## 2023-04-05 PROCEDURE — 73221 MRI JOINT UPR EXTREM W/O DYE: CPT | Mod: TC,PO,RT

## 2023-04-05 PROCEDURE — 73221 MRI ELBOW WITHOUT CONTRAST RIGHT: ICD-10-PCS | Mod: 26,RT,, | Performed by: RADIOLOGY

## 2023-04-12 ENCOUNTER — TELEPHONE (OUTPATIENT)
Dept: ORTHOPEDICS | Facility: CLINIC | Age: 48
End: 2023-04-12
Payer: COMMERCIAL

## 2023-04-12 NOTE — TELEPHONE ENCOUNTER
Called pt to schedule apt with  for bicep tendon tear. No answer, unable to schedule apt or leave [facility].

## 2023-04-17 ENCOUNTER — OFFICE VISIT (OUTPATIENT)
Dept: ORTHOPEDICS | Facility: CLINIC | Age: 48
End: 2023-04-17
Payer: COMMERCIAL

## 2023-04-17 VITALS — WEIGHT: 230 LBS | BODY MASS INDEX: 34.07 KG/M2 | HEIGHT: 69 IN

## 2023-04-17 DIAGNOSIS — S46.211A RUPTURE OF RIGHT DISTAL BICEPS TENDON, INITIAL ENCOUNTER: Primary | ICD-10-CM

## 2023-04-17 PROCEDURE — 1159F PR MEDICATION LIST DOCUMENTED IN MEDICAL RECORD: ICD-10-PCS | Mod: CPTII,S$GLB,, | Performed by: ORTHOPAEDIC SURGERY

## 2023-04-17 PROCEDURE — 3008F PR BODY MASS INDEX (BMI) DOCUMENTED: ICD-10-PCS | Mod: CPTII,S$GLB,, | Performed by: ORTHOPAEDIC SURGERY

## 2023-04-17 PROCEDURE — 1159F MED LIST DOCD IN RCRD: CPT | Mod: CPTII,S$GLB,, | Performed by: ORTHOPAEDIC SURGERY

## 2023-04-17 PROCEDURE — 3008F BODY MASS INDEX DOCD: CPT | Mod: CPTII,S$GLB,, | Performed by: ORTHOPAEDIC SURGERY

## 2023-04-17 PROCEDURE — 99204 PR OFFICE/OUTPT VISIT, NEW, LEVL IV, 45-59 MIN: ICD-10-PCS | Mod: S$GLB,,, | Performed by: ORTHOPAEDIC SURGERY

## 2023-04-17 PROCEDURE — 99204 OFFICE O/P NEW MOD 45 MIN: CPT | Mod: S$GLB,,, | Performed by: ORTHOPAEDIC SURGERY

## 2023-04-17 PROCEDURE — 99999 PR PBB SHADOW E&M-EST. PATIENT-LVL III: CPT | Mod: PBBFAC,,, | Performed by: ORTHOPAEDIC SURGERY

## 2023-04-17 PROCEDURE — 99999 PR PBB SHADOW E&M-EST. PATIENT-LVL III: ICD-10-PCS | Mod: PBBFAC,,, | Performed by: ORTHOPAEDIC SURGERY

## 2023-04-17 NOTE — H&P (VIEW-ONLY)
"  4/17/2023    Chief Complaint:  Chief Complaint   Patient presents with    Right Upper Arm - Injury, Pain     Right bicep        HPI:  Shiraz Pillai is a 47 y.o. male, who presents to clinic today has a history of an injury to his right arm.  He states that this happened approximately 5 weeks ago.  States that he was lifting when he felt a pop in his right arm and had pain.  He was seen by Dr. Quarles and an MRI was ordered due to concern over a distal biceps rupture.  He is here today for follow-up.  He is still complaining of severe weakness and pain to the right arm    PMHX:  No past medical history on file.    PSHX:  No past surgical history on file.    FMHX:  No family history on file.    SOCHX:  Social History     Tobacco Use    Smoking status: Never    Smokeless tobacco: Never   Substance Use Topics    Alcohol use: No       ALLERGIES:  Patient has no known allergies.    CURRENT MEDICATIONS:  Current Outpatient Medications on File Prior to Visit   Medication Sig Dispense Refill    meloxicam (MOBIC) 15 MG tablet Take 1 tablet (15 mg total) by mouth once daily. 30 tablet 11    methocarbamoL (ROBAXIN) 750 MG Tab Take 1 tablet (750 mg total) by mouth 4 (four) times daily as needed. 44 tablet 3     No current facility-administered medications on file prior to visit.       REVIEW OF SYSTEMS:  Review of Systems   Constitutional: Negative.    HENT: Negative.     Eyes: Negative.    Respiratory: Negative.     Cardiovascular: Negative.    Gastrointestinal: Negative.    Genitourinary: Negative.    Musculoskeletal:  Positive for joint pain.   Skin: Negative.    Neurological: Negative.    Endo/Heme/Allergies: Negative.    Psychiatric/Behavioral: Negative.       GENERAL PHYSICAL EXAM:   Ht 5' 9" (1.753 m)   Wt 104.3 kg (230 lb)   BMI 33.97 kg/m²    GEN: well developed, well nourished, no acute distress   HENT: Normocephalic, atraumatic   EYES: No discharge, conjunctiva normal   NECK: Supple, non-tender   PULM: " No wheezing, no respiratory distress   CV: RRR   ABD: Soft, non-tender    ORTHO EXAM:   Examination the right upper extremity reveals that there is no edema.  There is no significant ecchymosis.  There are no major skin changes.  Palpation over the distal biceps does produce significant tenderness specifically over the insertion at the radius.  Does not have significant amount of retraction and does not have a Austen muscle.  Is able to flex and extend the elbow but this does produce pain.  Has profound weakness of supination strength due to pain and inability to supinate.  Distally has a 2+ radial pulse and sensation is grossly intact in the median radial ulnar distribution    RADIOLOGY:   MRI of the right elbow has been reviewed.  There is noted to be an avulsion of the distal biceps from its tuberosity on the radius.  This appears to be retracted a distance of approximately 1 cm.    ASSESSMENT:   Right distal biceps tendon avulsion    PLAN:  1. I have discussed treatment with the patient.  I have discussed the possibility of distal biceps tendon repair.  I did discuss the risks and benefits of the procedure with the patient.  Informed consent for the procedure has been obtained    2.  Will proceed with right distal biceps tendon repair under general anesthesia with a single-shot supraclavicular block    3.  Will follow up with me 2 weeks postoperatively

## 2023-04-17 NOTE — PATIENT INSTRUCTIONS
Surgery Instructions:     Your surgery is scheduled on 04/25/23 at the surgery center: [address] [facility], 1st floor, second entrance.    The pre-op department will be in contact with you prior to your procedure to review medications and instructions.       Nothing to eat or drink after midnight prior to day of surgery.    The surgery center will contact you the day prior to surgery to advise you of your arrival time for surgery.     Your post op appointment is scheduled on 05/10/23 @ 1:40pm.    You will be contacted by an automated text message every morning for for 14 days after your surgery inquiring if you have any COVID symptoms.  If you have any concerns regarding COVID please reply to the text message and then an [facility] On Call Registered Nurse will contact you later that day.

## 2023-04-18 DIAGNOSIS — S46.211A RUPTURE OF RIGHT DISTAL BICEPS TENDON, INITIAL ENCOUNTER: Primary | ICD-10-CM

## 2023-04-18 RX ORDER — MUPIROCIN 20 MG/G
OINTMENT TOPICAL
Status: CANCELLED | OUTPATIENT
Start: 2023-04-18

## 2023-04-19 ENCOUNTER — PATIENT MESSAGE (OUTPATIENT)
Dept: ADMINISTRATIVE | Facility: HOSPITAL | Age: 48
End: 2023-04-19
Payer: COMMERCIAL

## 2023-04-24 ENCOUNTER — ANESTHESIA EVENT (OUTPATIENT)
Dept: SURGERY | Facility: HOSPITAL | Age: 48
End: 2023-04-24
Payer: COMMERCIAL

## 2023-04-25 ENCOUNTER — HOSPITAL ENCOUNTER (OUTPATIENT)
Facility: HOSPITAL | Age: 48
Discharge: HOME | End: 2023-04-25
Attending: ORTHOPAEDIC SURGERY | Admitting: ORTHOPAEDIC SURGERY
Payer: COMMERCIAL

## 2023-04-25 ENCOUNTER — ANESTHESIA (OUTPATIENT)
Dept: SURGERY | Facility: HOSPITAL | Age: 48
End: 2023-04-25
Payer: COMMERCIAL

## 2023-04-25 ENCOUNTER — HOSPITAL ENCOUNTER (OUTPATIENT)
Dept: RADIOLOGY | Facility: HOSPITAL | Age: 48
Discharge: HOME | End: 2023-04-25
Attending: ORTHOPAEDIC SURGERY | Admitting: ORTHOPAEDIC SURGERY
Payer: COMMERCIAL

## 2023-04-25 DIAGNOSIS — M79.601 RIGHT ARM PAIN: ICD-10-CM

## 2023-04-25 DIAGNOSIS — S46.211A RUPTURE OF RIGHT DISTAL BICEPS TENDON, INITIAL ENCOUNTER: ICD-10-CM

## 2023-04-25 PROCEDURE — 64415 PR NERVE BLOCK INJ, ANES/STEROID, BRACHIAL PLEXUS, INCL IMAG GUIDANCE: ICD-10-PCS | Mod: 59,RT,, | Performed by: ANESTHESIOLOGY

## 2023-04-25 PROCEDURE — 63600175 PHARM REV CODE 636 W HCPCS: Mod: PO | Performed by: ANESTHESIOLOGY

## 2023-04-25 PROCEDURE — 37000009 HC ANESTHESIA EA ADD 15 MINS: Mod: PO | Performed by: ORTHOPAEDIC SURGERY

## 2023-04-25 PROCEDURE — 36000708 HC OR TIME LEV III 1ST 15 MIN: Mod: PO | Performed by: ORTHOPAEDIC SURGERY

## 2023-04-25 PROCEDURE — 71000033 HC RECOVERY, INTIAL HOUR: Mod: PO | Performed by: ORTHOPAEDIC SURGERY

## 2023-04-25 PROCEDURE — 37000008 HC ANESTHESIA 1ST 15 MINUTES: Mod: PO | Performed by: ORTHOPAEDIC SURGERY

## 2023-04-25 PROCEDURE — 71000016 HC POSTOP RECOV ADDL HR: Mod: PO | Performed by: ORTHOPAEDIC SURGERY

## 2023-04-25 PROCEDURE — C1713 ANCHOR/SCREW BN/BN,TIS/BN: HCPCS | Mod: PO | Performed by: ORTHOPAEDIC SURGERY

## 2023-04-25 PROCEDURE — 36000709 HC OR TIME LEV III EA ADD 15 MIN: Mod: PO | Performed by: ORTHOPAEDIC SURGERY

## 2023-04-25 PROCEDURE — 64415 NJX AA&/STRD BRCH PLXS IMG: CPT | Mod: 59,RT,, | Performed by: ANESTHESIOLOGY

## 2023-04-25 PROCEDURE — D9220A PRA ANESTHESIA: ICD-10-PCS | Mod: ANES,,, | Performed by: ANESTHESIOLOGY

## 2023-04-25 PROCEDURE — C9290 INJ, BUPIVACAINE LIPOSOME: HCPCS | Mod: PO | Performed by: ANESTHESIOLOGY

## 2023-04-25 PROCEDURE — 25000003 PHARM REV CODE 250: Mod: PO | Performed by: NURSE ANESTHETIST, CERTIFIED REGISTERED

## 2023-04-25 PROCEDURE — 27201423 OPTIME MED/SURG SUP & DEVICES STERILE SUPPLY: Mod: PO | Performed by: ORTHOPAEDIC SURGERY

## 2023-04-25 PROCEDURE — 25000003 PHARM REV CODE 250: Mod: PO | Performed by: ANESTHESIOLOGY

## 2023-04-25 PROCEDURE — 24342 PR REINSERT BI/TRICEPS TENDON,DISTAL: ICD-10-PCS | Mod: RT,,, | Performed by: ORTHOPAEDIC SURGERY

## 2023-04-25 PROCEDURE — 71000015 HC POSTOP RECOV 1ST HR: Mod: PO | Performed by: ORTHOPAEDIC SURGERY

## 2023-04-25 PROCEDURE — 76000 FLUOROSCOPY <1 HR PHYS/QHP: CPT | Mod: TC,PO

## 2023-04-25 PROCEDURE — 64415 NJX AA&/STRD BRCH PLXS IMG: CPT | Mod: PO | Performed by: ANESTHESIOLOGY

## 2023-04-25 PROCEDURE — D9220A PRA ANESTHESIA: Mod: CRNA,,, | Performed by: NURSE ANESTHETIST, CERTIFIED REGISTERED

## 2023-04-25 PROCEDURE — 24342 REPAIR OF RUPTURED TENDON: CPT | Mod: RT,,, | Performed by: ORTHOPAEDIC SURGERY

## 2023-04-25 PROCEDURE — D9220A PRA ANESTHESIA: ICD-10-PCS | Mod: CRNA,,, | Performed by: NURSE ANESTHETIST, CERTIFIED REGISTERED

## 2023-04-25 PROCEDURE — 63600175 PHARM REV CODE 636 W HCPCS: Mod: PO | Performed by: NURSE ANESTHETIST, CERTIFIED REGISTERED

## 2023-04-25 PROCEDURE — 63600175 PHARM REV CODE 636 W HCPCS: Mod: PO | Performed by: PHYSICIAN ASSISTANT

## 2023-04-25 PROCEDURE — D9220A PRA ANESTHESIA: Mod: ANES,,, | Performed by: ANESTHESIOLOGY

## 2023-04-25 PROCEDURE — 25000003 PHARM REV CODE 250: Mod: PO | Performed by: PHYSICIAN ASSISTANT

## 2023-04-25 PROCEDURE — 27200651 HC AIRWAY, LMA: Mod: PO | Performed by: ANESTHESIOLOGY

## 2023-04-25 DEVICE — IMPLANTABLE DEVICE: Type: IMPLANTABLE DEVICE | Site: ARM | Status: FUNCTIONAL

## 2023-04-25 RX ORDER — DEXAMETHASONE SODIUM PHOSPHATE 4 MG/ML
INJECTION, SOLUTION INTRA-ARTICULAR; INTRALESIONAL; INTRAMUSCULAR; INTRAVENOUS; SOFT TISSUE
Status: DISCONTINUED | OUTPATIENT
Start: 2023-04-25 | End: 2023-04-25

## 2023-04-25 RX ORDER — PHENYLEPHRINE HYDROCHLORIDE 10 MG/ML
INJECTION INTRAVENOUS
Status: DISCONTINUED | OUTPATIENT
Start: 2023-04-25 | End: 2023-04-25

## 2023-04-25 RX ORDER — ACETAMINOPHEN 10 MG/ML
INJECTION, SOLUTION INTRAVENOUS
Status: DISCONTINUED | OUTPATIENT
Start: 2023-04-25 | End: 2023-04-25

## 2023-04-25 RX ORDER — FENTANYL CITRATE 50 UG/ML
INJECTION, SOLUTION INTRAMUSCULAR; INTRAVENOUS
Status: DISCONTINUED | OUTPATIENT
Start: 2023-04-25 | End: 2023-04-25

## 2023-04-25 RX ORDER — KETOROLAC TROMETHAMINE 30 MG/ML
INJECTION, SOLUTION INTRAMUSCULAR; INTRAVENOUS
Status: DISCONTINUED | OUTPATIENT
Start: 2023-04-25 | End: 2023-04-25

## 2023-04-25 RX ORDER — ONDANSETRON 8 MG/1
8 TABLET, ORALLY DISINTEGRATING ORAL EVERY 8 HOURS PRN
Qty: 3 TABLET | Refills: 0 | Status: SHIPPED | OUTPATIENT
Start: 2023-04-25 | End: 2023-09-29

## 2023-04-25 RX ORDER — SODIUM CHLORIDE 0.9 % (FLUSH) 0.9 %
10 SYRINGE (ML) INJECTION ONCE
Status: DISCONTINUED | OUTPATIENT
Start: 2023-04-25 | End: 2023-04-25 | Stop reason: HOSPADM

## 2023-04-25 RX ORDER — FENTANYL CITRATE 50 UG/ML
25 INJECTION, SOLUTION INTRAMUSCULAR; INTRAVENOUS EVERY 5 MIN PRN
Status: DISCONTINUED | OUTPATIENT
Start: 2023-04-25 | End: 2023-04-25 | Stop reason: HOSPADM

## 2023-04-25 RX ORDER — CEFAZOLIN SODIUM 2 G/50ML
2 SOLUTION INTRAVENOUS
Status: COMPLETED | OUTPATIENT
Start: 2023-04-25 | End: 2023-04-25

## 2023-04-25 RX ORDER — HYDROMORPHONE HYDROCHLORIDE 2 MG/ML
0.2 INJECTION, SOLUTION INTRAMUSCULAR; INTRAVENOUS; SUBCUTANEOUS EVERY 5 MIN PRN
Status: DISCONTINUED | OUTPATIENT
Start: 2023-04-25 | End: 2023-04-25 | Stop reason: HOSPADM

## 2023-04-25 RX ORDER — MEPERIDINE HYDROCHLORIDE 50 MG/ML
12.5 INJECTION INTRAMUSCULAR; INTRAVENOUS; SUBCUTANEOUS ONCE
Status: DISCONTINUED | OUTPATIENT
Start: 2023-04-25 | End: 2023-04-25 | Stop reason: HOSPADM

## 2023-04-25 RX ORDER — OXYCODONE HYDROCHLORIDE 5 MG/1
5 TABLET ORAL
Status: DISCONTINUED | OUTPATIENT
Start: 2023-04-25 | End: 2023-04-25 | Stop reason: HOSPADM

## 2023-04-25 RX ORDER — OXYCODONE HYDROCHLORIDE 10 MG/1
10 TABLET ORAL EVERY 4 HOURS PRN
Qty: 12 TABLET | Refills: 0 | Status: SHIPPED | OUTPATIENT
Start: 2023-04-25 | End: 2023-09-29

## 2023-04-25 RX ORDER — KETAMINE HCL IN 0.9 % NACL 50 MG/5 ML
SYRINGE (ML) INTRAVENOUS
Status: DISCONTINUED | OUTPATIENT
Start: 2023-04-25 | End: 2023-04-25

## 2023-04-25 RX ORDER — LIDOCAINE HYDROCHLORIDE 20 MG/ML
INJECTION INTRAVENOUS
Status: DISCONTINUED | OUTPATIENT
Start: 2023-04-25 | End: 2023-04-25

## 2023-04-25 RX ORDER — MUPIROCIN 20 MG/G
OINTMENT TOPICAL
Status: DISCONTINUED | OUTPATIENT
Start: 2023-04-25 | End: 2023-04-25 | Stop reason: HOSPADM

## 2023-04-25 RX ORDER — SODIUM CHLORIDE, SODIUM LACTATE, POTASSIUM CHLORIDE, CALCIUM CHLORIDE 600; 310; 30; 20 MG/100ML; MG/100ML; MG/100ML; MG/100ML
INJECTION, SOLUTION INTRAVENOUS CONTINUOUS
Status: DISCONTINUED | OUTPATIENT
Start: 2023-04-25 | End: 2023-04-25 | Stop reason: HOSPADM

## 2023-04-25 RX ORDER — MIDAZOLAM HYDROCHLORIDE 1 MG/ML
0.5 INJECTION INTRAMUSCULAR; INTRAVENOUS
Status: DISCONTINUED | OUTPATIENT
Start: 2023-04-25 | End: 2023-04-25 | Stop reason: HOSPADM

## 2023-04-25 RX ORDER — LIDOCAINE HYDROCHLORIDE 10 MG/ML
1 INJECTION, SOLUTION EPIDURAL; INFILTRATION; INTRACAUDAL; PERINEURAL ONCE
Status: DISCONTINUED | OUTPATIENT
Start: 2023-04-25 | End: 2023-04-25 | Stop reason: HOSPADM

## 2023-04-25 RX ORDER — BUPIVACAINE HYDROCHLORIDE 5 MG/ML
INJECTION, SOLUTION EPIDURAL; INTRACAUDAL
Status: COMPLETED | OUTPATIENT
Start: 2023-04-25 | End: 2023-04-25

## 2023-04-25 RX ORDER — PROPOFOL 10 MG/ML
VIAL (ML) INTRAVENOUS
Status: DISCONTINUED | OUTPATIENT
Start: 2023-04-25 | End: 2023-04-25

## 2023-04-25 RX ORDER — DIPHENHYDRAMINE HYDROCHLORIDE 50 MG/ML
25 INJECTION INTRAMUSCULAR; INTRAVENOUS EVERY 6 HOURS PRN
Status: DISCONTINUED | OUTPATIENT
Start: 2023-04-25 | End: 2023-04-25 | Stop reason: HOSPADM

## 2023-04-25 RX ADMIN — CEFAZOLIN SODIUM 2 G: 2 SOLUTION INTRAVENOUS at 01:04

## 2023-04-25 RX ADMIN — FENTANYL CITRATE 50 MCG: 50 INJECTION, SOLUTION INTRAMUSCULAR; INTRAVENOUS at 01:04

## 2023-04-25 RX ADMIN — PHENYLEPHRINE HYDROCHLORIDE 100 MCG: 10 INJECTION INTRAVENOUS at 02:04

## 2023-04-25 RX ADMIN — FENTANYL CITRATE 50 MCG: 50 INJECTION, SOLUTION INTRAMUSCULAR; INTRAVENOUS at 12:04

## 2023-04-25 RX ADMIN — MUPIROCIN: 20 OINTMENT TOPICAL at 11:04

## 2023-04-25 RX ADMIN — SODIUM CHLORIDE, POTASSIUM CHLORIDE, SODIUM LACTATE AND CALCIUM CHLORIDE: 600; 310; 30; 20 INJECTION, SOLUTION INTRAVENOUS at 11:04

## 2023-04-25 RX ADMIN — Medication 20 MG: at 01:04

## 2023-04-25 RX ADMIN — KETOROLAC TROMETHAMINE 30 MG: 30 INJECTION, SOLUTION INTRAMUSCULAR at 01:04

## 2023-04-25 RX ADMIN — PHENYLEPHRINE HYDROCHLORIDE 100 MCG: 10 INJECTION INTRAVENOUS at 01:04

## 2023-04-25 RX ADMIN — BUPIVACAINE HYDROCHLORIDE 5 ML: 5 INJECTION, SOLUTION EPIDURAL; INTRACAUDAL; PERINEURAL at 12:04

## 2023-04-25 RX ADMIN — SODIUM CHLORIDE, POTASSIUM CHLORIDE, SODIUM LACTATE AND CALCIUM CHLORIDE: 600; 310; 30; 20 INJECTION, SOLUTION INTRAVENOUS at 02:04

## 2023-04-25 RX ADMIN — DEXAMETHASONE SODIUM PHOSPHATE 8 MG: 4 INJECTION, SOLUTION INTRAMUSCULAR; INTRAVENOUS at 01:04

## 2023-04-25 RX ADMIN — MIDAZOLAM HYDROCHLORIDE 2 MG: 2 INJECTION, SOLUTION INTRAMUSCULAR; INTRAVENOUS at 12:04

## 2023-04-25 RX ADMIN — PROPOFOL 200 MG: 10 INJECTION, EMULSION INTRAVENOUS at 01:04

## 2023-04-25 RX ADMIN — ACETAMINOPHEN 1000 MG: 10 INJECTION, SOLUTION INTRAVENOUS at 01:04

## 2023-04-25 RX ADMIN — BUPIVACAINE 10 ML: 13.3 INJECTION, SUSPENSION, LIPOSOMAL INFILTRATION at 12:04

## 2023-04-25 RX ADMIN — LIDOCAINE HYDROCHLORIDE 100 MG: 20 INJECTION INTRAVENOUS at 01:04

## 2023-04-25 NOTE — ANESTHESIA PREPROCEDURE EVALUATION
04/25/2023  Shiraz Pillai is a 47 y.o., male.      Pre-op Assessment    I have reviewed the Patient Summary Reports.     I have reviewed the Nursing Notes.       Review of Systems  Anesthesia Hx:  No problems with previous Anesthesia    Cardiovascular:  Cardiovascular Normal     Pulmonary:  Pulmonary Normal    Renal/:   Chronic Renal Disease renal calculi        Physical Exam  General: Well nourished        Anesthesia Plan  Type of Anesthesia, risks & benefits discussed:    Anesthesia Type: Gen Supraglottic Airway  Intra-op Monitoring Plan: Standard ASA Monitors  Post Op Pain Control Plan: multimodal analgesia, peripheral nerve block and IV/PO Opioids PRN  Induction:  IV  Informed Consent: Informed consent signed with the Patient and all parties understand the risks and agree with anesthesia plan.  All questions answered.   ASA Score: 2  Day of Surgery Review of History & Physical: H&P Update referred to the surgeon/provider.    Ready For Surgery From Anesthesia Perspective.     .

## 2023-04-25 NOTE — OR NURSING
Peripheral nerve block complete with Dr. Hollis at bedside. Bleeding controlled to injection site at right shoulder/neck. Patient is awake and alert, answering questions appropriately. VSS. Wife is at bedside with patient.

## 2023-04-25 NOTE — TRANSFER OF CARE
"Anesthesia Transfer of Care Note    Patient: Shiraz Pillai    Procedure(s) Performed: Procedure(s) (LRB):  Right distal biceps tendon repair (Right)    Patient location: PACU    Anesthesia Type: general    Transport from OR: Transported from OR on room air with adequate spontaneous ventilation    Post pain: adequate analgesia    Post assessment: no apparent anesthetic complications and tolerated procedure well    Post vital signs: stable    Level of consciousness: sedated and awake    Nausea/Vomiting: no nausea/vomiting    Complications: none    Transfer of care protocol was followed      Last vitals:   Visit Vitals  /70 (BP Location: Left arm, Patient Position: Lying)   Pulse 83   Temp 36.2 °C (97.1 °F) (Skin)   Resp 15   Ht 5' 9" (1.753 m)   Wt 104.3 kg (230 lb)   SpO2 100%   BMI 33.97 kg/m²     "

## 2023-04-25 NOTE — OP NOTE
Shiraz Pillai  1975    DATE OF SURGERY: 4/25/2023     PRE-OPERATIVE DIAGNOSIS:  Right distal biceps tendon rupture    POST-OPERATIVE DIAGNOSIS:  Right distal biceps tendon rupture     ANESTHESIA TYPE:  General with a single-shot interscalene block    BLOOD LOSS:  25-50 cc    TOURNIQUET TIME:  None    SURGEON: Dr Álvarez    ASSISTANT: Demi Bradford    PROCEDURE:  Right distal biceps tendon repair    IMPLANTS:  Biomet distal biceps tendon button x1     SPECIMENS:  None    INDICATION:     Mr. Pillai had a history of a rupture of his right distal biceps tendon.  This was confirmed with MRI.  At that point we had a discussion about the risks and benefits of repair of the distal biceps tendon.  Informed consent was then obtained.    PROCEDURE IN DETAIL:     Mr. Pillai was transported to the operating room and was placed supine on the operating room table. All appropriate points were padded. The right arm and hand was prepped and draped in the normal sterile fashion. Time out was called. The correct patient, correct operative site, correct procedure, antibiotic administration which consisted of 2 g of Ancef, and allergies to medications which are to Patient has no known allergies.  were reviewed. Time in was then called.     Attention was turned to the right forearm.  A 3-4 cm incision was made directly over the region of the radial tuberosity and a volar forearm.  The incision was carried through the skin.  Subcutaneous tissue was dissected with tenotomy scissors.  The interval between the brachioradialis and the pronator was explored.  The superficial radial nerve was visualized and was protected.  The vascular arcade including the recurrent radial artery was visualized.  Two of the crossing veins were ligated with 2-0 silk ties and were cut.  The remainder of the vascular arcade was retracted.  The distal biceps tendon was visualized.  It was noted to be detached from the biceps tuberosity was  retracted approximately 1 cm.  Biceps tendon was trimmed.  It was freed up proximally.  A FiberTape was used in a locking grasping fashion to secure the distal biceps tendon.  The ends of the suture were then passed through the button.  The remaining ends were then passed back up the tendon in a reverse tensioning technique.  Attention was turned back to the radius.  Homans were placed around the radius.  The radial tuberosity was identified.  A guide pin was placed in the center of the tuberosity at the attachment of the biceps.  This was confirmed under fluoroscopy.  The guide pin was placed bicortically.  The distal biceps was then measured and was noted to be a size 7.  A size 7 Reamer was then used to over drill the near cortex of the radius.  The wound was copiously irrigated and all bony debris was removed.  The button was then placed onto the  and was passed across the radius.  The button was flipped on the far cortex and was tension.  Fluoroscopy was taken there was noted to be in an excellent position.  The arm was flexed approximately 45° and then the biceps tendon was advanced into the drill hole in the radius.  Proximally 1 cm of biceps mated into the radius.  The sutures were then tied over the top.  This provided very firm fixation of the repair.  Final fluoroscopy was used to confirm the button placement.  This was excellent.  The wound was then again copiously irrigated.  The elbow was taken through range of motion there was tightness of the elbow past 45° from full extension.  The wound was noted have good hemostasis and therefore the wound was closed with combination of 3-0 Vicryl subcutaneous sutures and 3-0 nylon superficial sutures.  The wound was dressed with Xeroform, gauze padding, cast padding and a long-arm posterior splint was placed with the elbow flexed at 90°.      The patient was awakened from anesthesia and was transported to the recovery room in stable condition. All lap,  needle, sponge, and equipment counts were correct at the end of the case.    POST-OPERATIVE PLAN:     Patient will keep the splint in place for 2 weeks which time I will place him into a hinged elbow brace.  I will begin gentle range of motion of the elbow at the 4-6 week postoperative shaun and I will increase his extension by 10° per week until he reaches full extension

## 2023-04-25 NOTE — ANESTHESIA PROCEDURE NOTES
Peripheral Block    Patient location during procedure: pre-op   Block not for primary anesthetic.  Reason for block: at surgeon's request and post-op pain management   Post-op Pain Location: right biceps   Start time: 4/25/2023 12:15 PM  Timeout: 4/25/2023 12:13 PM   End time: 4/25/2023 12:21 PM    Staffing  Authorizing Provider: Priscilla Hollis MD  Performing Provider: Priscilla Hollis MD    Preanesthetic Checklist  Completed: patient identified, IV checked, site marked, risks and benefits discussed, surgical consent, monitors and equipment checked, pre-op evaluation and timeout performed  Peripheral Block  Patient position: sitting  Prep: ChloraPrep  Patient monitoring: heart rate, cardiac monitor, continuous pulse ox, continuous capnometry and frequent blood pressure checks  Block type: interscalene  Laterality: right  Injection technique: single shot  Needle  Needle type: Stimuplex   Needle gauge: 22 G  Needle length: 2 in  Needle localization: anatomical landmarks and ultrasound guidance   -ultrasound image captured on disc.  Assessment  Injection assessment: negative aspiration, negative parasthesia and local visualized surrounding nerve  Paresthesia pain: none  Heart rate change: no  Slow fractionated injection: yes  Pain Tolerance: comfortable throughout block and no complaints  Medications:    Medications: bupivacaine (pf) (MARCAINE) injection 0.5% - Perineural   5 mL - 4/25/2023 12:21:00 PM    Additional Notes  VSS.  [facility] RN monitoring vitals throughout procedure.  Patient tolerated procedure well.     Exparel 10mL

## 2023-04-25 NOTE — PATIENT INSTRUCTIONS
Procedure:  Right distal biceps tendon repair    1.  Please keep the splint clean, dry, and in place. Do not take it off and do not get it wet.    2.  Please keep the sling to the right arm in place until you follow-up with Dr. Álvarez at the 2 week shaun    3.  The pain block to the right arm will last between 1 and 3 days.  As soon as the pain block begins to wear off you can begin taking the prescribed pain medication    4.  Nausea medication has been prescribed in the case that you have any postoperative stomach upset    5.  Flexion and extension of the exposed fingers is encouraged but do NOT attempt to lift or push off with the right arm or hand.    6.  If there are any questions or concerns please call Dr. Álvarez office at [phone]    7.  Follow up with Dr. Álvarez in 2 weeks

## 2023-04-25 NOTE — DISCHARGE SUMMARY
[facility] - Surgery  Discharge Note  Short Stay    Procedure(s) (LRB):  Right distal biceps tendon repair (Right)      OUTCOME: Patient tolerated treatment/procedure well without complication and is now ready for discharge.    DISPOSITION: Home or Self Care    FINAL DIAGNOSIS:  Rupture of right distal biceps tendon    FOLLOWUP: In clinic    DISCHARGE INSTRUCTIONS:    Discharge Procedure Orders   SLING ORTHOPEDIC LARGE FOR HOME USE     Diet general     Activity as tolerated     Keep surgical extremity elevated     Lifting restrictions   Order Comments: Please do not lift or push off with the right arm or hand     Leave dressing on - Keep it clean, dry, and intact until clinic visit     Call MD for:  temperature >100.4     Call MD for:  persistent nausea and vomiting     Call MD for:  severe uncontrolled pain     Call MD for:  difficulty breathing, headache or visual disturbances     Call MD for:  redness, tenderness, or signs of infection (pain, swelling, redness, odor or green/yellow discharge around incision site)     Call MD for:  hives     Call MD for:  persistent dizziness or light-headedness     Call MD for:  extreme fatigue        TIME SPENT ON DISCHARGE: 15 minutes

## 2023-04-26 VITALS
WEIGHT: 230 LBS | BODY MASS INDEX: 34.07 KG/M2 | HEART RATE: 82 BPM | HEIGHT: 69 IN | DIASTOLIC BLOOD PRESSURE: 71 MMHG | OXYGEN SATURATION: 95 % | RESPIRATION RATE: 18 BRPM | SYSTOLIC BLOOD PRESSURE: 111 MMHG | TEMPERATURE: 97 F

## 2023-05-02 DIAGNOSIS — S46.211A RUPTURE OF RIGHT DISTAL BICEPS TENDON, INITIAL ENCOUNTER: Primary | ICD-10-CM

## 2023-05-10 ENCOUNTER — HOSPITAL ENCOUNTER (OUTPATIENT)
Dept: RADIOLOGY | Facility: HOSPITAL | Age: 48
Discharge: HOME | End: 2023-05-10
Attending: ORTHOPAEDIC SURGERY
Payer: COMMERCIAL

## 2023-05-10 ENCOUNTER — OFFICE VISIT (OUTPATIENT)
Dept: ORTHOPEDICS | Facility: CLINIC | Age: 48
End: 2023-05-10
Payer: COMMERCIAL

## 2023-05-10 VITALS — WEIGHT: 230 LBS | BODY MASS INDEX: 34.07 KG/M2 | HEIGHT: 69 IN

## 2023-05-10 DIAGNOSIS — S46.211A RUPTURE OF RIGHT DISTAL BICEPS TENDON, INITIAL ENCOUNTER: ICD-10-CM

## 2023-05-10 DIAGNOSIS — S46.211D RUPTURE OF RIGHT DISTAL BICEPS TENDON, SUBSEQUENT ENCOUNTER: Primary | ICD-10-CM

## 2023-05-10 PROCEDURE — 3008F BODY MASS INDEX DOCD: CPT | Mod: CPTII,S$GLB,, | Performed by: ORTHOPAEDIC SURGERY

## 2023-05-10 PROCEDURE — 99024 PR POST-OP FOLLOW-UP VISIT: ICD-10-PCS | Mod: S$GLB,,, | Performed by: ORTHOPAEDIC SURGERY

## 2023-05-10 PROCEDURE — 99999 PR PBB SHADOW E&M-EST. PATIENT-LVL III: ICD-10-PCS | Mod: PBBFAC,,, | Performed by: ORTHOPAEDIC SURGERY

## 2023-05-10 PROCEDURE — 99024 POSTOP FOLLOW-UP VISIT: CPT | Mod: S$GLB,,, | Performed by: ORTHOPAEDIC SURGERY

## 2023-05-10 PROCEDURE — 73080 X-RAY EXAM OF ELBOW: CPT | Mod: TC,PO,RT

## 2023-05-10 PROCEDURE — 99999 PR PBB SHADOW E&M-EST. PATIENT-LVL III: CPT | Mod: PBBFAC,,, | Performed by: ORTHOPAEDIC SURGERY

## 2023-05-10 PROCEDURE — 73080 XR ELBOW COMPLETE 3 VIEW RIGHT: ICD-10-PCS | Mod: 26,RT,, | Performed by: RADIOLOGY

## 2023-05-10 PROCEDURE — 73080 X-RAY EXAM OF ELBOW: CPT | Mod: 26,RT,, | Performed by: RADIOLOGY

## 2023-05-10 PROCEDURE — 1159F PR MEDICATION LIST DOCUMENTED IN MEDICAL RECORD: ICD-10-PCS | Mod: CPTII,S$GLB,, | Performed by: ORTHOPAEDIC SURGERY

## 2023-05-10 PROCEDURE — 3008F PR BODY MASS INDEX (BMI) DOCUMENTED: ICD-10-PCS | Mod: CPTII,S$GLB,, | Performed by: ORTHOPAEDIC SURGERY

## 2023-05-10 PROCEDURE — 1159F MED LIST DOCD IN RCRD: CPT | Mod: CPTII,S$GLB,, | Performed by: ORTHOPAEDIC SURGERY

## 2023-05-15 NOTE — PROGRESS NOTES
Mr Pillai returns to clinic today.  Has a history of right distal biceps tendon repair.  He is 2 weeks postop and doing well.  There are no major complaints.      Physical exam:  Examination of the right arm and elbow reveals that the incision is healing well.  There is no edema or erythema.  There are sutures in place.  The biceps tendon remains palpable.  Does have sensation intact in the median radial ulnar distribution and capillary refill is less than 2 seconds in the digits.  Does have very mild decreased sensation over the lateral antebrachial cutaneous distribution     Radiology:  X-rays of the right elbow were taken in clinic today.  There is noted to be evidence of the distal biceps repair with excellent positioning of the button     Assessment:  Status post right distal biceps tendon repair     Plan:    1.  Sutures were removed today and Steri-Strips are placed    2. Was placed into a hinged elbow brace which will leave locked at 90°    3.  Will follow up in 3-4 weeks at which point I will consider instituting therapy to begin range of motion

## 2023-05-24 ENCOUNTER — OFFICE VISIT (OUTPATIENT)
Dept: ORTHOPEDICS | Facility: CLINIC | Age: 48
End: 2023-05-24
Payer: COMMERCIAL

## 2023-05-24 DIAGNOSIS — S46.211D RUPTURE OF RIGHT DISTAL BICEPS TENDON, SUBSEQUENT ENCOUNTER: Primary | ICD-10-CM

## 2023-05-24 PROCEDURE — 99024 POSTOP FOLLOW-UP VISIT: CPT | Mod: S$GLB,,, | Performed by: ORTHOPAEDIC SURGERY

## 2023-05-24 PROCEDURE — 1159F MED LIST DOCD IN RCRD: CPT | Mod: CPTII,S$GLB,, | Performed by: ORTHOPAEDIC SURGERY

## 2023-05-24 PROCEDURE — 99024 PR POST-OP FOLLOW-UP VISIT: ICD-10-PCS | Mod: S$GLB,,, | Performed by: ORTHOPAEDIC SURGERY

## 2023-05-24 PROCEDURE — 99999 PR PBB SHADOW E&M-EST. PATIENT-LVL III: ICD-10-PCS | Mod: PBBFAC,,, | Performed by: ORTHOPAEDIC SURGERY

## 2023-05-24 PROCEDURE — 1159F PR MEDICATION LIST DOCUMENTED IN MEDICAL RECORD: ICD-10-PCS | Mod: CPTII,S$GLB,, | Performed by: ORTHOPAEDIC SURGERY

## 2023-05-24 PROCEDURE — 99999 PR PBB SHADOW E&M-EST. PATIENT-LVL III: CPT | Mod: PBBFAC,,, | Performed by: ORTHOPAEDIC SURGERY

## 2023-05-30 ENCOUNTER — CLINICAL SUPPORT (OUTPATIENT)
Dept: REHABILITATION | Facility: HOSPITAL | Age: 48
End: 2023-05-30
Payer: COMMERCIAL

## 2023-05-30 DIAGNOSIS — M25.621 DECREASED RANGE OF MOTION OF RIGHT ELBOW: ICD-10-CM

## 2023-05-30 DIAGNOSIS — S46.211D RUPTURE OF RIGHT DISTAL BICEPS TENDON, SUBSEQUENT ENCOUNTER: ICD-10-CM

## 2023-05-30 DIAGNOSIS — M62.81 MUSCLE WEAKNESS: ICD-10-CM

## 2023-05-30 DIAGNOSIS — M25.521 RIGHT ELBOW PAIN: ICD-10-CM

## 2023-05-30 PROCEDURE — 97110 THERAPEUTIC EXERCISES: CPT | Mod: PN

## 2023-05-30 PROCEDURE — 97140 MANUAL THERAPY 1/> REGIONS: CPT | Mod: PN

## 2023-05-30 PROCEDURE — 97166 OT EVAL MOD COMPLEX 45 MIN: CPT | Mod: PN

## 2023-05-30 PROCEDURE — 97535 SELF CARE MNGMENT TRAINING: CPT | Mod: PN

## 2023-05-30 NOTE — PATIENT INSTRUCTIONS
[facility], OCCUPATIONAL THERAPY  HOME EXERCISE PROGRAM     Complete the following massages for 3-5 min each, 2x/day.                                     Scar massage to surgical incision        AROM: Elbow Flexion / Extension        With right hand palm up, gently bend elbow as far as possible. Then straighten arm as far as possible.  Repeat _10__ times per set. Do _1-2_ sets per session. Do _2__ sessions per day.  Repeat above motion with palm sideways and palm facing down.     AROM: Forearm Pronation / Supination        With right arm in handshake position, slowly rotate palm down until stretch is felt. Relax. Then rotate palm up until stretch is felt.  Repeat _10__ times per set. Do _1-2__ sets per session. Do _2__ sessions per day.

## 2023-05-30 NOTE — PLAN OF CARE
"[facility]  Occupational Therapy Initial Evaluation      Name: Shiraz Pillai  Clinic Number: [id number]    Therapy Diagnosis:   Encounter Diagnoses   Name Primary?    Rupture of right distal biceps tendon, subsequent encounter     Right elbow pain     Decreased range of motion of right elbow     Muscle weakness      Physician: Johnny Álvarez MD    Physician Orders: Please begin therapy to the right elbow hand and wrist.  Include gentle range of motion.  Please limit weight-bearing to less than 1 lb    Frequency:  2-3 times per week   Duration:  4-6 weeks    Medical Diagnosis: S46.211D (ICD-10-CM) - Rupture of right distal biceps tendon, subsequent encounter; Status post right distal biceps tendon repair   Surgical Procedure and Date: PROCEDURE:  Right distal biceps tendon repair , 04/25/23  Evaluation Date: 5/30/2023  Insurance Authorization Period Expiration: 12/31/23  Plan of Care Certification Period: 05/30/23 to 07/10/23  Date of Return to MD: 06/21/23  Visit # / Visits authorized: 1 / pending  FOTO: 1/3  Initial-69% / Goal=34%    Precautions:  Standard, blood thinners, and Weightbearing    Time In: 4:05 pm  Time Out: 5:15 pm  Total Appointment Time (timed & untimed codes): 70 minutes    Subjective      Date of Onset:  Feb 2023; 4/25/23 surgery    History of Current Condition/Mechanism of Injury: Shiraz reports he was holding a heavy object and when the object shifted he attempted to catch it from falling.  He felt and heard a "pop" in his R arm/elbow.  Patient saw his PCP who attempted to treat condition conservatively for 2 weeks.  When conservative measures failed he was referred to a general ortho MD (Dr. Quarles). After Dr. Quarles examined patient and reviewed the MRI patient was referred to Dr. Álvarez for a surgical consult. Patient saw Dr. Álvarez and surgery was scheduled for 04/25/23    Falls: None    Involved Side: RUE  Dominant Side: Right    Mechanism " "of Injury: Traumatic rupture of biceps  Surgical Procedure: Right distal biceps tendon repair , 04/25/23  Imaging: MRI studies X-rays    Prior Therapy: None    Pain:  Functional Pain Scale Rating 0-10:   2/10 on average  0/10 at best  4-5/10 at worst  Location: R elbow/forearm  Description: Aching, Dull, Tingling, and Numb  Aggravating Factors: any exertion or pressure to the elbow area  Easing Factors: rest, elevation, and removing the brace helps with sensation/numbness    Occupation:    Working presently: employed  Duties: administrative, supervisory    Functional Limitations/Social History:    Previous functional status includes: Independent with all ADL/IADL's    Current Functional Status   Home/Living environment: lives with their family    - one story home, no steps to enter    - DME: elbow hinged brace      Limitation of Functional Status as follows:   ADLs/IADLs:     - Feeding: moderate/minimal difficulty/pain cutting foods    - Bathing: moderate difficulty     - Dressing/Grooming: moderate/minimal difficulty with fasteners    - Driving: mostly using L hand, but has moderate discomfort while using R hand with brace    Subjective:  Patient admits to not wearing the hinged orthosis while at home during his daily ADL/IADL tasks.  He also admits to using his RUE in some tasks, including lifting shopping bags and using a weed eater.  He denies any pain with any of the reported activities.      Patient's Goals for Therapy: "to be able to go bow hunting in November with a 70 lb bow tension"    Past Medical History/Physical Systems Review:   Shiraz Pillai  has a past medical history of Kidney stones and Necrotizing fasciitis.    Shiraz Pillai  has a past surgical history that includes Foot surgery (Left); Lithotripsy; and Biceps tendon repair (Right, 4/25/2023).    Shiraz has a current medication list which includes the following prescription(s): meloxicam, " "methocarbamol, ondansetron, and oxycodone.    Review of patient's allergies indicates:  No Known Allergies     Objective      Observation: Surgical incision is well healed with no signs of infection.  Scar tissue is moderate to minimally dense at the surgical site.  Patient is noted to have moderate to significant myofascial tightness of the forearm extensor & flexor muscles.     Palpation:  Patient is mildly tender to palpation at the surgical incision.  Distal bicep tendon is noted to be intact.      Edema: (Circumferential measures in cm)   05/30/23 05/30/23    Left Right   Elbow Crease  31.5 32.5   Below Elbow (4 cm) 33.6 34.1      Active Range of Motion:   Elbow Left Right   Flexion    extension WNL -25   pronation WNL 64   supination WNL 49      Upper Extremity Strength   Left Right   Elbow flexion: 5/5 3/5   Elbow extension: 5/5 3-/5   Forearm pronation 5/5 3/5   Forearm supination 5/5 3-/5     Sensation:  Patient reports numbness & tingling and decreased sensation over the lateral antebrachial cutaneous distribution at this time. Light touch, temperature, sharp and dull are grossly intact in R elbow/forearm/wrist/hand          Strength: (LIZZIE Dynamometer in lbs.) Average 3 trials, Position II:  Strength (in pounds):   05/30/23 05/30/23    Left Right    II Deferred Deferred   Lateral " "   Tripod " "   Tip " "       Limitation/Restriction for FOTO Elbow Survey    Therapist reviewed FOTO scores for Shiraz Pillai on 5/30/2023.   FOTO documents entered into [employer] - see Media section.    Limitation Score: 69%       Treatment      Total Treatment time (time-based codes) separate from Evaluation: 40 minutes    Shiraz received the treatments listed below:      therapeutic exercises to develop ROM for 15 minutes including:  -Performed initial HEP, see Patient Instructions for details    manual therapy techniques: Manual Lymphatic Drainage, Soft tissue Mobilization, and Scar management " were applied to the: R upper arm/elbow/forearm for 12 minutes, including:  use of hand techniques to increase blood flow/circulation, improve soft tissue pliability and decrease pain.     Self-Care/ADL Retraining for 13 minutes. The following activities were included:  - Patient and his spouse were educated on the surgical procedure, importance of adhering to precautions and activity restrictions for optimal outcome and possible consequences of noncompliance of recommendations, including re-rupture of biceps tendon.   - Reiterated activity restrictions and limitations in post op healing phase, including non-weightbearing or avoid lifting of greater than 1 lb with the RUE  - Patient instructed on activity modification in ADL/IADL's to improve adherence to post op precautions recommended by MD and therapist.   - Reviewed recommendations for wearing hinged elbow orthosis.     Patient and spouse verbalized understanding of all instructions provided today.       Patient Education and Home Exercises      Education provided:   -role of OT, goals for OT, scheduling/cancellations, insurance limitations with patient.  -Additional Education provided: Established initial Home Exercise Program, see Patient Instructions for details    Written Home Exercises Provided: yes.  Exercises were reviewed and Shiraz was able to demonstrate them prior to the end of the session.    Shiraz demonstrated good  understanding of the education provided.     Pt was advised to perform these exercises free of pain, and to stop performing them if pain occurs.    See EMR under Patient Instructions for exercises provided 5/30/2023.    Assessment      Shiraz Pillai is a 47 y.o. male referred to outpatient occupational therapy and presents with a medical diagnosis of S46.211D (ICD-10-CM) - Rupture of right distal biceps tendon, subsequent encounter.  Following medical record review it is determined that pt will benefit from occupational  therapy services in order to maximize pain free and/or functional use of right upper extremity. The following goals were discussed with the patient and patient is in agreement with them as to be addressed in the treatment plan. The patient's rehab potential is Good to Fair    Anticipated barriers to occupational therapy:  compliance with post op precautions, wearing of hinged elbow orthosis and HEP;  attendance to therapy as recommended    Plan of care discussed with patient: Yes  Patient's spiritual, cultural and educational needs considered and patient is agreeable to the plan of care and goals as stated below:     Medical Necessity is demonstrated by the following  Occupational Profile/History  Co-morbidities and personal factors that may impact the plan of care [] LOW: Brief chart review  [x] MODERATE: Expanded chart review   [] HIGH: Extensive chart review    Moderate / High Support Documentation: patient's history since onset;      Examination  Performance deficits relating to physical, cognitive or psychosocial skills that result in activity limitations and/or participation restrictions  [] LOW: addressing 1-3 Performance deficits  [] MODERATE: 3-5 Performance deficits  [x] HIGH: 5+ Performance deficits (please support below)    Moderate / High Support Documentation:    Physical:  Joint Mobility  Muscle Power/Strength  Muscle Endurance  Skin Integrity/Scar Formation  Edema   Strength  Pinch Strength  Gross Motor Coordination  Proprioception Functions  Pain    Cognitive:  Safety Awareness/Insight to Disability    Psychosocial:    No Deficits     Treatment Options [] LOW: Limited options  [] MODERATE: Several options  [] HIGH: Multiple options      Decision Making/ Complexity Score: moderate       The following goals were discussed with the patient and patient is in agreement with them as to be addressed in the treatment plan.     Goals:   Short Term Goals: (in 3 weeks)  1) Patient will be independent in  HEP  2) Decrease pain in R elbow/forearm to no more than 2-3/10 worst in ADL/IADL's  3) Increase AROM in R elbow extension by 8-10 degrees for improved functioning in ADL/IADL's  4) Increase AROM off R forearm supination by 10-15 degrees for improved functioning in ADL/IADL's  5) Assess  strength  6) Decrease edema in R elbow by .3-.4 cm     Long Term Goals: (in 6 weeks)  1) Decrease pain in R elbow/forearm to no more than 0-1/10 worst in ADL/IADL's  2) Increase AROM of R elbow/forearm, in all functional planes of mobility, to WFL for increased functioning in ADL/IADL's  3) Increase strength in R elbow, flexion/extension, to 4+/5 for improved functioning in ADL/IADL's  4) Increase R  strength by 15-20% of initial measures for improved functioning in ADL/IADL's.   5) Increased functioning in ADL/IADL's, as evidenced by a FOTO impairment rating of no more than 34%  5) Decrease edema in R elbow/forearm to trace or none    Plan     Plan of Care Certification:  05/30/23 to 07/10/23.     Outpatient Occupational Therapy 2 times weekly for 6 weeks to include the following interventions: Paraffin, Fluidotherapy, Manual therapy/joint mobilizations, Modalities for pain management, US 3 mhz, Therapeutic exercises/activities., Strengthening, Orthotic Fabrication/Fit/Training, Edema Control, Scar Management, Electrical Modalities, and Joint Protection.    Too Saha, OT

## 2023-06-02 ENCOUNTER — CLINICAL SUPPORT (OUTPATIENT)
Dept: REHABILITATION | Facility: HOSPITAL | Age: 48
End: 2023-06-02
Payer: COMMERCIAL

## 2023-06-02 DIAGNOSIS — M62.81 MUSCLE WEAKNESS: ICD-10-CM

## 2023-06-02 DIAGNOSIS — M25.621 DECREASED RANGE OF MOTION OF RIGHT ELBOW: ICD-10-CM

## 2023-06-02 DIAGNOSIS — M25.521 RIGHT ELBOW PAIN: Primary | ICD-10-CM

## 2023-06-02 PROCEDURE — 97035 APP MDLTY 1+ULTRASOUND EA 15: CPT | Mod: PN

## 2023-06-02 PROCEDURE — 97140 MANUAL THERAPY 1/> REGIONS: CPT | Mod: PN

## 2023-06-02 PROCEDURE — 97112 NEUROMUSCULAR REEDUCATION: CPT | Mod: PN

## 2023-06-02 PROCEDURE — 97110 THERAPEUTIC EXERCISES: CPT | Mod: PN

## 2023-06-02 NOTE — PATIENT INSTRUCTIONS
[facility]  OCCUPATIONAL THERAPY  HOME EXERCISE PROGRAM     Complete the following strengthening program 1x/day.                       _                Too Saha OTL  Occupational Therapist

## 2023-06-02 NOTE — PROGRESS NOTES
[facility]  Occupational Therapy Treatment Note     Date: 6/2/2023  Name: Shiraz Pillai  Clinic Number: [id number]    Therapy Diagnosis:   Encounter Diagnoses   Name Primary?    Right elbow pain Yes    Decreased range of motion of right elbow     Muscle weakness       Physician: Johnny Álvarez MD    Physician Orders: Please begin therapy to the right elbow hand and wrist.  Include gentle range of motion.  Please limit weight-bearing to less than 1 lb    Frequency:  2-3 times per week   Duration:  4-6 weeks    Medical Diagnosis: S46.211D (ICD-10-CM) - Rupture of right distal biceps tendon, subsequent encounter; Status post right distal biceps tendon repair   Surgical Procedure and Date: PROCEDURE:  Right distal biceps tendon repair , 04/25/23  Evaluation Date: 5/30/2023  Insurance Authorization Period Expiration: 12/31/23  Plan of Care Certification Period: 05/30/23 to 07/10/23  Date of Return to MD: 06/21/23  Visit # / Visits authorized: 1 / pending  FOTO: 1/3  Initial-69% / Goal=34%    Time In: 10:00 am  Time Out: 11:15 am  Total Billable Time: 75 minutes  (1US, 2MT, 1NM, 2TE)    Subjective     Pt reports: no problems & good compliance w/ initial HEP.  He also reports having no significant pain at the surgical site, but more pain along the EPL/APL.   He was compliant with home exercise program given last session.   Response to previous treatment: No adverse reactions noted or reported from initial evaluation  Functional change: None reported at first treatment after initial evaluation    Pain: 0/10  Location: right elbow/forearm      Objective     Objective Measures updated at progress report unless specified.    Special tests (06/02/23):  Finkelstein's= positive, R wrist/hand; L negative    Treatment     Patient is 5 weeks 4 days post op distal Biceps repair    Shiraz received the treatments listed below:     Direct contact modalities after being cleared for  "contraindications: 1.0 MHz, 1.0 W/cm2, continuous, 10 min to R elbow/forearm surgical site, to decrease pain & edema, increase circulation and tissue extensibility     Manual therapy techniques: Myofacial release and Soft tissue Mobilization were applied to the: R upper arm/elbow/forearm for 25 minutes, including:  - use of hand techniques, cupping and IASTM tools to increase blood flow/circulation, improve soft tissue pliability and decrease pain.   - Kinesiotaping: 3 "I" strips in a De Quervain's taping pattern applied to R forearm/wrist/thumb for pain and soft tissue management. Patient instructed on purpose, wear, care, precautions to monitor and removal of KT. Patient verbalized understanding of all instructions provided.      Therapeutic exercises to develop ROM and flexibility for 29 minutes, including:  AROM    Elbow 3 ways, seated, short arc 10 reps each, 0# wt   Forearm sup/pron 10 reps, 0# wt   Prayer Stretch 3 reps, 10 sec hold   OH Triceps Ext stretch 3 reps, 10 sec hold    De'Quervain's stretch 3 reps, 15 sec hold   Supine:    Elbow Extension-shld flexion 90  10 reps, sagittal plane   Elbow Extension-shld flexion 90 10 reps, horizontal plane       Performed for Home Exercise:    T-putty Yellow/green   -Molding 2 min   -Grasp/manipulation 3 reps, 5 sec hold   -Log rolls w/ tripod & lateral pinch 1 log each   -Pancakes  & Extension blooms 2 reps      Neuromuscular re-education activities to improve Posture for 8 minutes. The following activities were included:  Good Posture/Bad Posture 3 reps   Shoulder Circles-backwards 5 reps   Dexterciser 2 min    UE Nerve Decompression Sequence  2 trials      supervised modalities after being cleared for contradictions:   Ice massage for 3 minutes to R forearm/wrist (over EPL/APL), post-treatment, for pain and soft tissue edema.    Patient Education and Home Exercises     Education provided:   - Reviewed initial Home Exercise Program  - Added T-putty to Home Exercise " Program for light hand strengthening; patient was provided with a supply of yellow/green T-putty   - Discussed activity restrictions/limitations and avoidance of repetitive strain in ADL/IADL's to address symptoms of De'quervain's  - Discussed activity restrictions and limitations in post op healing phase  - Progress towards goals     Written Home Exercises Provided: Patient instructed to cont prior HEP and instructed to perform added exercises per recommendation  Exercises were reviewed and Shiraz was able to demonstrate them prior to the end of the session.  Shiraz demonstrated good  understanding of the HEP provided. See EMR under Patient Instructions for exercises provided during therapy sessions.       Assessment     Pain and edema is minimal in R elbow/forearm today.  Patient does present with signs/symptoms of De'Quervain's, however, this did improve with interventions performed today in therapy.  Patient presents also with moderate to significant Myofascial tightness in R forearm flexor & extensor muscles, however, this too improved by the end of treatment today.  Patient also noted to have less numbness and tingling in his R hand after manual therapy.       Shiraz is progressing well towards his goals and there are no updates to goals at this time. Pt prognosis is Good.     Pt will continue to benefit from skilled outpatient occupational therapy to address the deficits listed in the problem list on initial evaluation provide pt/family education and to maximize pt's level of independence in the home and community environment.     Pt's spiritual, cultural and educational needs considered and pt agreeable to plan of care and goals.    Anticipated barriers to occupational therapy: comorbid diagnosis listed above, compliance with HEP and attendance to therapy as recommended    Goals:  Short Term Goals: (in 3 weeks)  1) Patient will be independent in HEP  2) Decrease pain in R elbow/forearm to no more than  2-3/10 worst in ADL/IADL's  3) Increase AROM in R elbow extension by 8-10 degrees for improved functioning in ADL/IADL's  4) Increase AROM off R forearm supination by 10-15 degrees for improved functioning in ADL/IADL's  5) Assess  strength  6) Decrease edema in R elbow by .3-.4 cm      Long Term Goals: (in 6 weeks)  1) Decrease pain in R elbow/forearm to no more than 0-1/10 worst in ADL/IADL's  2) Increase AROM of R elbow/forearm, in all functional planes of mobility, to WFL for increased functioning in ADL/IADL's  3) Increase strength in R elbow, flexion/extension, to 4+/5 for improved functioning in ADL/IADL's  4) Increase R  strength by 15-20% of initial measures for improved functioning in ADL/IADL's.   5) Increased functioning in ADL/IADL's, as evidenced by a FOTO impairment rating of no more than 34%  5) Decrease edema in R elbow/forearm to trace or none    Plan     Plan of Care Certification:  05/30/23 to 07/10/23.      Outpatient Occupational Therapy 2 times weekly for 6 weeks to include the following interventions: Paraffin, Fluidotherapy, Manual therapy/joint mobilizations, Modalities for pain management, US 3 mhz, Therapeutic exercises/activities., Strengthening, Orthotic Fabrication/Fit/Training, Edema Control, Scar Management, Electrical Modalities, and Joint Protection.    Updates/Grading for next session: continue to address de' quervain's symptoms and progress therapy program, per protocol, as tolerated.     Too Saha, OT

## 2023-06-14 ENCOUNTER — CLINICAL SUPPORT (OUTPATIENT)
Dept: REHABILITATION | Facility: HOSPITAL | Age: 48
End: 2023-06-14
Payer: COMMERCIAL

## 2023-06-14 DIAGNOSIS — M25.621 DECREASED RANGE OF MOTION OF RIGHT ELBOW: ICD-10-CM

## 2023-06-14 DIAGNOSIS — M62.81 MUSCLE WEAKNESS: ICD-10-CM

## 2023-06-14 DIAGNOSIS — M25.521 RIGHT ELBOW PAIN: Primary | ICD-10-CM

## 2023-06-14 PROCEDURE — 97140 MANUAL THERAPY 1/> REGIONS: CPT | Mod: PN

## 2023-06-14 PROCEDURE — 97110 THERAPEUTIC EXERCISES: CPT | Mod: PN

## 2023-06-14 PROCEDURE — 97035 APP MDLTY 1+ULTRASOUND EA 15: CPT | Mod: PN

## 2023-06-14 NOTE — PROGRESS NOTES
[facility]  Occupational Therapy Treatment Note     Date: 6/14/2023  Name: Shiraz Pillai  Clinic Number: [id number]    Therapy Diagnosis:   Encounter Diagnoses   Name Primary?    Right elbow pain Yes    Decreased range of motion of right elbow     Muscle weakness       Physician: Johnny Álvarez MD    Physician Orders: Please begin therapy to the right elbow hand and wrist.  Include gentle range of motion.  Please limit weight-bearing to less than 1 lb    Frequency:  2-3 times per week   Duration:  4-6 weeks    Medical Diagnosis: S46.211D (ICD-10-CM) - Rupture of right distal biceps tendon, subsequent encounter; Status post right distal biceps tendon repair   Surgical Procedure and Date: PROCEDURE:  Right distal biceps tendon repair , 04/25/23  Evaluation Date: 5/30/2023  Insurance Authorization Period Expiration: 12/31/23  Plan of Care Certification Period: 05/30/23 to 07/10/23  Date of Return to MD: 06/21/23  Visit # / Visits authorized: 2 / 20  FOTO: 1/3  Initial-69% / Goal=34%    Time In: 5:00 pm  Time Out: 6:00 pm  Total Billable Time: 60 minutes  (1US, 1MT, 2TE)    Subjective     Pt reports: numbness in in R forearm has decrease. Numbness remains only in the R palm and thumb.   He was compliant with home exercise program given last session.   Response to previous treatment: No adverse reactions noted or reported from initial evaluation  Functional change: None reported at first treatment after initial evaluation    Pain: 0/10  Location: right elbow/forearm      Objective     Objective Measures updated at progress report unless specified.    Special tests (06/02/23):  Finkelstein's= positive, R wrist/hand; L negative    Treatment     Patient is 7 weeks 2 days post op distal Biceps repair    Shiraz received the treatments listed below:     Direct contact modalities after being cleared for contraindications: 1.0 MHz, 1.0 W/cm2, continuous, 10 min to R elbow/forearm  surgical site, to decrease pain & edema, increase circulation and tissue extensibility     Manual therapy techniques: Myofacial release and Soft tissue Mobilization were applied to the: R upper arm/elbow/forearm for 18 minutes, including:  - use of hand techniques, cupping and IASTM tools to increase blood flow/circulation, improve soft tissue pliability and decrease pain.     Therapeutic exercises to develop ROM and flexibility for 32 minutes, including:  AROM    Elbow 3 ways, seated, short arc 10 reps each, 0# wt   Forearm sup/pron 10 reps, 0# wt   De'Quervain's stretch 3 reps, 15 sec hold       Strengthening::    Isometrics    -Shld flex, ext, Abd, Add, ER, IR 10 reps each   -Triceps, sub-max 5 reps   PHG 3/10 setting 5 (50#), 5/10, (38#)   T-putty Yellow/green   -Dowel digs 2 min   -Grasp/manipulation 3 reps, 5 sec hold   -Log rolls w/ tripod & lateral pinch 1 log each   -Pancakes  & Extension blooms 2 reps        Patient Education and Home Exercises     Education provided:   - Reviewed initial Home Exercise Program  - Added shoulder and triceps isometric exercises to Home Exercise Program for light strengthening  - Patient was provided with a supply of red T-putty to progress hand strengthening home program  - Discussed activity restrictions/limitations and avoidance of repetitive strain in ADL/IADL's to address symptoms of De'quervain's  - Discussed activity restrictions and limitations in post op healing phase  - Progress towards goals     Written Home Exercises Provided: Patient instructed to cont prior HEP and instructed to perform added exercises per recommendation  Exercises were reviewed and Shiraz was able to demonstrate them prior to the end of the session.  Shiraz demonstrated good  understanding of the HEP provided. See EMR under Patient Instructions for exercises provided during therapy sessions.       Assessment     Patient has no pain and trace edema in R elbow/forearm today.  Signs/symptoms of  De'Quervain's has improved as well, but has not completely resolved. Shoulder and triceps light strengthening initiated today without any increase in pain or difficulty.  R forearm myofascial tightness continues to be present at onset of OT treatment in R forearm flexor & extensor muscles, but improves after manual therapy. Numbness and tingling in his R forearm and hand is decreasing.         Shiraz is progressing well towards his goals and there are no updates to goals at this time. Pt prognosis is Good.     Pt will continue to benefit from skilled outpatient occupational therapy to address the deficits listed in the problem list on initial evaluation provide pt/family education and to maximize pt's level of independence in the home and community environment.     Pt's spiritual, cultural and educational needs considered and pt agreeable to plan of care and goals.    Anticipated barriers to occupational therapy: comorbid diagnosis listed above, compliance with HEP and attendance to therapy as recommended    Goals:  Short Term Goals: (in 3 weeks)  1) Patient will be independent in HEP  2) Decrease pain in R elbow/forearm to no more than 2-3/10 worst in ADL/IADL's  3) Increase AROM in R elbow extension by 8-10 degrees for improved functioning in ADL/IADL's  4) Increase AROM off R forearm supination by 10-15 degrees for improved functioning in ADL/IADL's  5) Assess  strength  6) Decrease edema in R elbow by .3-.4 cm      Long Term Goals: (in 6 weeks)  1) Decrease pain in R elbow/forearm to no more than 0-1/10 worst in ADL/IADL's  2) Increase AROM of R elbow/forearm, in all functional planes of mobility, to WFL for increased functioning in ADL/IADL's  3) Increase strength in R elbow, flexion/extension, to 4+/5 for improved functioning in ADL/IADL's  4) Increase R  strength by 15-20% of initial measures for improved functioning in ADL/IADL's.   5) Increased functioning in ADL/IADL's, as evidenced by a FOTO  impairment rating of no more than 34%  5) Decrease edema in R elbow/forearm to trace or none    Plan     Plan of Care Certification:  05/30/23 to 07/10/23.      Outpatient Occupational Therapy 2 times weekly for 6 weeks to include the following interventions: Paraffin, Fluidotherapy, Manual therapy/joint mobilizations, Modalities for pain management, US 3 mhz, Therapeutic exercises/activities., Strengthening, Orthotic Fabrication/Fit/Training, Edema Control, Scar Management, Electrical Modalities, and Joint Protection.    Updates/Grading for next session: continue to address de' quervain's symptoms and progress therapy program, per protocol, as tolerated.     Too Saha, OT

## 2023-06-14 NOTE — PATIENT INSTRUCTIONS
[facility] - OCCUPATIONAL THERAPY  HOME EXERCISE PROGRAM        Strengthening: Isometric Flexion        Using wall for resistance, press right fist into ball using light pressure. Hold _5__ seconds.  Repeat _10_ times per set. Do _1-3_ sets per session. Do _1-3__ sessions per day.     Strengthening: Isometric Extension        Using wall for resistance, press back of left arm into ball using light pressure. Hold _5__ seconds.  Repeat _10_ times per set. Do _1-3_ sets per session. Do _1-3_ sessions per day.     Strengthening: Isometric Abduction        Using wall for resistance, press left arm into ball using light pressure. Hold _5_ seconds.  Repeat _10__ times per set. Do _1-3_ sets per session. Do _1-3_ sessions per day.    Strengthening: Isometric Adduction        Using body for resistance, gently press right arm into ball using light pressure. Hold __5_ seconds.  Repeat _10_ times per set. Do _1-3_ sets per session. Do _1-3_ sessions per day.    Strengthening: Isometric External Rotation        Using wall to provide resistance, and keeping right arm at side, press back of hand into ball using light pressure. Hold _5_ seconds.  Repeat _10_ times per set. Do _1-3_ sets per session. Do _1-3_ sessions per day.     Strengthening: Isometric Internal Rotation        Using door frame for resistance, press palm of right hand into ball using light pressure. Keep elbow in at side. Hold _5_ seconds.  Repeat _10_ times per set. Do _1-3_ sets per session. Do _1-3_ sessions per day.          Too Saha, OT

## 2023-06-19 ENCOUNTER — CLINICAL SUPPORT (OUTPATIENT)
Dept: REHABILITATION | Facility: HOSPITAL | Age: 48
End: 2023-06-19
Payer: COMMERCIAL

## 2023-06-19 DIAGNOSIS — M25.621 DECREASED RANGE OF MOTION OF RIGHT ELBOW: ICD-10-CM

## 2023-06-19 DIAGNOSIS — M25.521 RIGHT ELBOW PAIN: Primary | ICD-10-CM

## 2023-06-19 DIAGNOSIS — M62.81 MUSCLE WEAKNESS: ICD-10-CM

## 2023-06-19 PROCEDURE — 97110 THERAPEUTIC EXERCISES: CPT | Mod: PN

## 2023-06-19 PROCEDURE — 97035 APP MDLTY 1+ULTRASOUND EA 15: CPT | Mod: PN

## 2023-06-19 PROCEDURE — 97140 MANUAL THERAPY 1/> REGIONS: CPT | Mod: PN

## 2023-06-19 NOTE — PROGRESS NOTES
[facility]  Occupational Therapy Treatment Note     Date: 6/19/2023  Name: Shiraz Pillai  Clinic Number: [id number]    Therapy Diagnosis:   Encounter Diagnoses   Name Primary?    Right elbow pain Yes    Decreased range of motion of right elbow     Muscle weakness       Physician: Johnny Álvarez MD    Physician Orders: Please begin therapy to the right elbow hand and wrist.  Include gentle range of motion.  Please limit weight-bearing to less than 1 lb    Frequency:  2-3 times per week   Duration:  4-6 weeks    Medical Diagnosis: S46.211D (ICD-10-CM) - Rupture of right distal biceps tendon, subsequent encounter; Status post right distal biceps tendon repair   Surgical Procedure and Date: PROCEDURE:  Right distal biceps tendon repair , 04/25/23  Evaluation Date: 5/30/2023  Insurance Authorization Period Expiration: 12/31/23  Plan of Care Certification Period: 05/30/23 to 07/10/23  Date of Return to MD: 06/21/23  Visit # / Visits authorized: 2 / 20  FOTO: 1/3  Initial-69% / Goal=34%    Time In: 4:40 pm  Time Out: 5:25 pm  Total Billable Time: 45 minutes  (1US, 1MT, 1TE)    Subjective     Pt reports: he only has numbness in the R palm and thumb. He feels condition is improving. He also reports he has a follow up appointment with his referring MD on 06/21/23.   He was compliant with home exercise program given last session.   Response to previous treatment: decreased edema; less scar tissue  Functional change: patient able to hold and manipulate light to moderate weighted objects with his R hand without difficulty    Pain: 0/10  Location: right elbow/forearm      Objective     Objective Measures updated at progress report unless specified.    Treatment     Patient is 8 weeks post op distal Biceps repair    Shiraz received the treatments listed below:     Direct contact modalities after being cleared for contraindications: 1.0 MHz, 1.0 W/cm2, continuous, 10 min to R  elbow/forearm surgical site, to decrease pain & edema, increase circulation and tissue extensibility     Manual therapy techniques: Myofacial release and Soft tissue Mobilization were applied to the: R upper arm/elbow/forearm for 15 minutes, including:  - use of hand techniques, cupping and IASTM tools to increase blood flow/circulation, improve soft tissue pliability and decrease pain.     Therapeutic exercises to develop ROM and flexibility for 20 minutes, including:  AROM    Elbow 3 ways, seated, short arc 10 reps each, 0# wt   Forearm sup/pron 10 reps, 0# wt       Strengthening::    Isometrics    -Triceps, sub-max 10 reps, 10 sec hold   -Biceps, 2 ways, sub-max 10 reps. 10 sec hold each   Wrist 3 ways over wedge 20 reps, 2# wt   PHG 3/10 setting 5 (50#), 5/10, (38#)        Patient Education and Home Exercises     Education provided:   - Reviewed Home Exercise Program, including shoulder and triceps isometric exercises and T-putty    - Discussed activity restrictions/limitations and avoidance of repetitive strain in ADL/IADL's to address symptoms of De'quervain's  - Discussed activity restrictions and limitations in post op healing phase  - Progress towards goals     Written Home Exercises Provided: Patient instructed to cont prior HEP and instructed to perform added exercises per recommendation  Exercises were reviewed and Shiraz was able to demonstrate them prior to the end of the session.  Shiraz demonstrated good  understanding of the HEP provided. See EMR under Patient Instructions for exercises provided during therapy sessions.       Assessment     Patient continues to have no pain in his RUE.  Edema is trace to none. Scar tissue is minimally dense with minimal adhesions to underlying soft tissue.  R forearm myofascial tightness has improved with each OT treatment.  Numbness and tingling in his R forearm has improved, but patient continues to have it in his hand/thumb.  Patient was able to perform all  above listed therapeutic exercises without increased pain or difficulty today.            Shiraz is progressing well towards his goals and there are no updates to goals at this time. Pt prognosis is Good.     Pt will continue to benefit from skilled outpatient occupational therapy to address the deficits listed in the problem list on initial evaluation provide pt/family education and to maximize pt's level of independence in the home and community environment.     Pt's spiritual, cultural and educational needs considered and pt agreeable to plan of care and goals.    Anticipated barriers to occupational therapy: comorbid diagnosis listed above, compliance with HEP and attendance to therapy as recommended    Goals:  Short Term Goals: (in 3 weeks)  1) Patient will be independent in HEP  2) Decrease pain in R elbow/forearm to no more than 2-3/10 worst in ADL/IADL's  3) Increase AROM in R elbow extension by 8-10 degrees for improved functioning in ADL/IADL's  4) Increase AROM off R forearm supination by 10-15 degrees for improved functioning in ADL/IADL's  5) Assess  strength  6) Decrease edema in R elbow by .3-.4 cm      Long Term Goals: (in 6 weeks)  1) Decrease pain in R elbow/forearm to no more than 0-1/10 worst in ADL/IADL's  2) Increase AROM of R elbow/forearm, in all functional planes of mobility, to WFL for increased functioning in ADL/IADL's  3) Increase strength in R elbow, flexion/extension, to 4+/5 for improved functioning in ADL/IADL's  4) Increase R  strength by 15-20% of initial measures for improved functioning in ADL/IADL's.   5) Increased functioning in ADL/IADL's, as evidenced by a FOTO impairment rating of no more than 34%  5) Decrease edema in R elbow/forearm to trace or none    Plan     Plan of Care Certification:  05/30/23 to 07/10/23.      Outpatient Occupational Therapy 2 times weekly for 6 weeks to include the following interventions: Paraffin, Fluidotherapy, Manual therapy/joint  mobilizations, Modalities for pain management, US 3 mhz, Therapeutic exercises/activities., Strengthening, Orthotic Fabrication/Fit/Training, Edema Control, Scar Management, Electrical Modalities, and Joint Protection.    Updates/Grading for next session: progress therapy program, per protocol and MD orders, as tolerated.     Too Saha, OT

## 2023-06-21 ENCOUNTER — CLINICAL SUPPORT (OUTPATIENT)
Dept: REHABILITATION | Facility: HOSPITAL | Age: 48
End: 2023-06-21
Payer: COMMERCIAL

## 2023-06-21 ENCOUNTER — OFFICE VISIT (OUTPATIENT)
Dept: ORTHOPEDICS | Facility: CLINIC | Age: 48
End: 2023-06-21
Payer: COMMERCIAL

## 2023-06-21 VITALS — HEIGHT: 69 IN | WEIGHT: 230 LBS | BODY MASS INDEX: 34.07 KG/M2

## 2023-06-21 DIAGNOSIS — S46.211D RUPTURE OF RIGHT DISTAL BICEPS TENDON, SUBSEQUENT ENCOUNTER: Primary | ICD-10-CM

## 2023-06-21 DIAGNOSIS — M62.81 MUSCLE WEAKNESS: ICD-10-CM

## 2023-06-21 DIAGNOSIS — M25.521 RIGHT ELBOW PAIN: Primary | ICD-10-CM

## 2023-06-21 DIAGNOSIS — M25.621 DECREASED RANGE OF MOTION OF RIGHT ELBOW: ICD-10-CM

## 2023-06-21 PROCEDURE — 1159F MED LIST DOCD IN RCRD: CPT | Mod: CPTII,S$GLB,, | Performed by: ORTHOPAEDIC SURGERY

## 2023-06-21 PROCEDURE — 1159F PR MEDICATION LIST DOCUMENTED IN MEDICAL RECORD: ICD-10-PCS | Mod: CPTII,S$GLB,, | Performed by: ORTHOPAEDIC SURGERY

## 2023-06-21 PROCEDURE — 99024 PR POST-OP FOLLOW-UP VISIT: ICD-10-PCS | Mod: S$GLB,,, | Performed by: ORTHOPAEDIC SURGERY

## 2023-06-21 PROCEDURE — 99999 PR PBB SHADOW E&M-EST. PATIENT-LVL III: ICD-10-PCS | Mod: PBBFAC,,, | Performed by: ORTHOPAEDIC SURGERY

## 2023-06-21 PROCEDURE — 3008F PR BODY MASS INDEX (BMI) DOCUMENTED: ICD-10-PCS | Mod: CPTII,S$GLB,, | Performed by: ORTHOPAEDIC SURGERY

## 2023-06-21 PROCEDURE — 97110 THERAPEUTIC EXERCISES: CPT | Mod: PN

## 2023-06-21 PROCEDURE — 97140 MANUAL THERAPY 1/> REGIONS: CPT | Mod: PN

## 2023-06-21 PROCEDURE — 97035 APP MDLTY 1+ULTRASOUND EA 15: CPT | Mod: PN

## 2023-06-21 PROCEDURE — 3008F BODY MASS INDEX DOCD: CPT | Mod: CPTII,S$GLB,, | Performed by: ORTHOPAEDIC SURGERY

## 2023-06-21 PROCEDURE — 99024 POSTOP FOLLOW-UP VISIT: CPT | Mod: S$GLB,,, | Performed by: ORTHOPAEDIC SURGERY

## 2023-06-21 PROCEDURE — 99999 PR PBB SHADOW E&M-EST. PATIENT-LVL III: CPT | Mod: PBBFAC,,, | Performed by: ORTHOPAEDIC SURGERY

## 2023-06-21 NOTE — PROGRESS NOTES
[facility]  Occupational Therapy Treatment Note     Date: 6/21/2023  Name: Shiraz Pillai  Clinic Number: [id number]    Therapy Diagnosis:   Encounter Diagnoses   Name Primary?    Right elbow pain Yes    Decreased range of motion of right elbow     Muscle weakness       Physician: Johnny Álvarez MD    Physician Orders: Please begin therapy to the right elbow hand and wrist.  Include gentle range of motion.  Please limit weight-bearing to less than 1 lb    Frequency:  2-3 times per week   Duration:  4-6 weeks    Medical Diagnosis: S46.211D (ICD-10-CM) - Rupture of right distal biceps tendon, subsequent encounter; Status post right distal biceps tendon repair   Surgical Procedure and Date: PROCEDURE:  Right distal biceps tendon repair , 04/25/23  Evaluation Date: 5/30/2023  Insurance Authorization Period Expiration: 12/31/23  Plan of Care Certification Period: 05/30/23 to 07/10/23  Date of Return to MD: 06/21/23  Visit # / Visits authorized: 4 / 20  FOTO: 1/3  Initial-69% / Goal=34%    Time In: 4:45 pm  Time Out: 5:40 pm  Total Billable Time: 55 minutes  (1US, 1MT, 2TE)    Subjective     Pt reports: completed a follow up appointment with his referring MD on today and recommendations to continue OT were made.   He was compliant with home exercise program given last session.   Response to previous treatment: decreased edema; less scar tissue  Functional change: patient able to hold and manipulate light to moderate weighted objects with his R hand without difficulty    Pain: 0/10  Location: right elbow/forearm      Objective     Objective Measures updated at progress report unless specified.    Treatment     Patient is 8 weeks post op distal Biceps repair    Shiraz received the treatments listed below:     Direct contact modalities after being cleared for contraindications: 1.0 MHz, 1.0 W/cm2, continuous, 10 min to R elbow/forearm surgical site, to decrease pain & edema,  increase circulation and tissue extensibility     Manual therapy techniques: Myofacial release and Soft tissue Mobilization were applied to the: R upper arm/elbow/forearm for 15 minutes, including:  - use of hand techniques, cupping and IASTM tools to increase blood flow/circulation, improve soft tissue pliability and decrease pain.     Therapeutic exercises to develop ROM and flexibility for 30 minutes, including:  Strengthening::    Triceps, overhead 15 reps, 2# wt & 10 reps, 3# wt   Biceps 3 ways 10 reps. 3#, short arc   Forearm supination/pronation 8 reps, 1# wt, elbow 0   Wrist 3 ways over wedge 10 reps, 4# wt   PHG 3/10 setting 5 (50#), 5/10, (38#)   Smiles & Frowns 10 reps each, yellow   Wrist Revs-2 ways 10 reps each way, yellow       Forearm supination stretch 3 reps, 30 sec hold        Patient Education and Home Exercises     Education provided:   - Reviewed Home Exercise Program, including shoulder and triceps isometric exercises and T-putty    - Discussed activity restrictions/limitations and avoidance of repetitive strain in ADL/IADL's to address symptoms of De'quervain's  - Discussed activity restrictions and limitations in post op healing phase  - Progress towards goals     Written Home Exercises Provided: Patient instructed to cont prior HEP and instructed to perform added exercises per recommendation  Exercises were reviewed and Shiraz was able to demonstrate them prior to the end of the session.  Shiraz demonstrated good  understanding of the HEP provided. See EMR under Patient Instructions for exercises provided during therapy sessions.       Assessment     No pain reported in his RUE.  Edema remains trace to none in R elbow/forearm. Scar tissue remains minimally dense with minimal adhesions to underlying soft tissue.  R forearm myofascial tightness has improved with each OT treatment.  Therapeutic exercises advanced today.  Patient was able to perform all above listed therapeutic exercises  without increased pain or difficulty today.          Shiraz is progressing well towards his goals and there are no updates to goals at this time. Pt prognosis is Good.     Pt will continue to benefit from skilled outpatient occupational therapy to address the deficits listed in the problem list on initial evaluation provide pt/family education and to maximize pt's level of independence in the home and community environment.     Pt's spiritual, cultural and educational needs considered and pt agreeable to plan of care and goals.    Anticipated barriers to occupational therapy: comorbid diagnosis listed above, compliance with HEP and attendance to therapy as recommended    Goals:  Short Term Goals: (in 3 weeks)  1) Patient will be independent in HEP  2) Decrease pain in R elbow/forearm to no more than 2-3/10 worst in ADL/IADL's  3) Increase AROM in R elbow extension by 8-10 degrees for improved functioning in ADL/IADL's  4) Increase AROM off R forearm supination by 10-15 degrees for improved functioning in ADL/IADL's  5) Assess  strength  6) Decrease edema in R elbow by .3-.4 cm      Long Term Goals: (in 6 weeks)  1) Decrease pain in R elbow/forearm to no more than 0-1/10 worst in ADL/IADL's  2) Increase AROM of R elbow/forearm, in all functional planes of mobility, to WFL for increased functioning in ADL/IADL's  3) Increase strength in R elbow, flexion/extension, to 4+/5 for improved functioning in ADL/IADL's  4) Increase R  strength by 15-20% of initial measures for improved functioning in ADL/IADL's.   5) Increased functioning in ADL/IADL's, as evidenced by a FOTO impairment rating of no more than 34%  5) Decrease edema in R elbow/forearm to trace or none    Plan     Plan of Care Certification:  05/30/23 to 07/10/23.      Outpatient Occupational Therapy 2 times weekly for 6 weeks to include the following interventions: Paraffin, Fluidotherapy, Manual therapy/joint mobilizations, Modalities for pain  management, US 3 mhz, Therapeutic exercises/activities., Strengthening, Orthotic Fabrication/Fit/Training, Edema Control, Scar Management, Electrical Modalities, and Joint Protection.    Updates/Grading for next session: progress therapy program, per protocol and MD orders, as tolerated.     Too Saha, OT

## 2023-06-26 ENCOUNTER — CLINICAL SUPPORT (OUTPATIENT)
Dept: REHABILITATION | Facility: HOSPITAL | Age: 48
End: 2023-06-26
Payer: COMMERCIAL

## 2023-06-26 DIAGNOSIS — M62.81 MUSCLE WEAKNESS: ICD-10-CM

## 2023-06-26 DIAGNOSIS — M25.521 RIGHT ELBOW PAIN: Primary | ICD-10-CM

## 2023-06-26 DIAGNOSIS — M25.621 DECREASED RANGE OF MOTION OF RIGHT ELBOW: ICD-10-CM

## 2023-06-26 PROCEDURE — 97110 THERAPEUTIC EXERCISES: CPT | Mod: PN

## 2023-06-26 PROCEDURE — 97035 APP MDLTY 1+ULTRASOUND EA 15: CPT | Mod: PN

## 2023-06-26 PROCEDURE — 97140 MANUAL THERAPY 1/> REGIONS: CPT | Mod: PN

## 2023-06-26 NOTE — PROGRESS NOTES
[facility]  Occupational Therapy Treatment Note     Date: 6/26/2023  Name: Shiraz Pillai  Clinic Number: [id number]    Therapy Diagnosis:   Encounter Diagnoses   Name Primary?    Right elbow pain Yes    Decreased range of motion of right elbow     Muscle weakness       Physician: Johnny Álvarez MD    Physician Orders: Please begin therapy to the right elbow hand and wrist.  Include gentle range of motion.  Please limit weight-bearing to less than 1 lb    Frequency:  2-3 times per week   Duration:  4-6 weeks    Medical Diagnosis: S46.211D (ICD-10-CM) - Rupture of right distal biceps tendon, subsequent encounter; Status post right distal biceps tendon repair   Surgical Procedure and Date: PROCEDURE:  Right distal biceps tendon repair , 04/25/23  Evaluation Date: 5/30/2023  Insurance Authorization Period Expiration: 12/31/23  Plan of Care Certification Period: 05/30/23 to 07/10/23  Date of Return to MD: 06/21/23  Visit # / Visits authorized: 4 / 20  FOTO: 1/3  Initial-69% / Goal=34%    Time In: 4:40 pm  Time Out: 5::20 pm  Total Billable Time: 40 minutes  (1US, 1MT, 1TE)    Subjective     Pt reports: completed a follow up appointment with his referring MD on today and recommendations to continue OT were made.   He was compliant with home exercise program given last session.   Response to previous treatment: decreased edema; less scar tissue  Functional change: patient able to hold and manipulate light to moderate weighted objects with his R hand without difficulty    Pain: 0/10  Location: right elbow/forearm      Objective     Objective Measures updated at progress report unless specified.    Treatment     Patient is 8 weeks post op distal Biceps repair    Shiraz received the treatments listed below:     Direct contact modalities after being cleared for contraindications: 1.0 MHz, 1.0 W/cm2, continuous, 10 min to R elbow/forearm surgical site, to decrease pain & edema,  increase circulation and tissue extensibility     Manual therapy techniques: Myofacial release and Soft tissue Mobilization were applied to the: R upper arm/elbow/forearm for 10 minutes, including:  - use of hand techniques, cupping and IASTM tools to increase blood flow/circulation, improve soft tissue pliability and decrease pain.     Therapeutic exercises to develop ROM and flexibility for 20 minutes, including:  Strengthening::    UBE-forward only 8 min, level 2.0   Triceps, overhead 15 reps, 5# wt    Biceps 3 ways 10 reps. 5#, short arc   Forearm supination/pronation 10 reps, 1.5# wt, held at end   Wrist 3 ways over wedge 20 reps, 4# wt   PHG 2/10 setting 2 (61#), red spring   Smiles & Frowns 15 reps each, red t-bar   Wrist Revs-2 ways 15 reps each way, red t-bar       Forearm supination stretch 3 reps, 30 sec hold        Patient Education and Home Exercises     Education provided:   - Reviewed Home Exercise Program, including shoulder and triceps isometric exercises and T-putty    - Discussed activity restrictions/limitations and avoidance of repetitive strain in ADL/IADL's to address symptoms of De'quervain's  - Discussed activity restrictions and limitations in post op healing phase  - Progress towards goals     Written Home Exercises Provided: Patient instructed to cont prior HEP and instructed to perform added exercises per recommendation  Exercises were reviewed and Shiraz was able to demonstrate them prior to the end of the session.  Shiraz demonstrated good  understanding of the HEP provided. See EMR under Patient Instructions for exercises provided during therapy sessions.       Assessment     Therapeutic exercises further advanced today with patient was able to perform all above listed therapeutic exercises without increased pain or difficulty today.  Scar tissue is minimal and without adhesions.  Pain is none.  Recommend to progress strengthening, as tolerated         Shiraz is progressing well  towards his goals and there are no updates to goals at this time. Pt prognosis is Good.     Pt will continue to benefit from skilled outpatient occupational therapy to address the deficits listed in the problem list on initial evaluation provide pt/family education and to maximize pt's level of independence in the home and community environment.     Pt's spiritual, cultural and educational needs considered and pt agreeable to plan of care and goals.    Anticipated barriers to occupational therapy: comorbid diagnosis listed above, compliance with HEP and attendance to therapy as recommended    Goals:  Short Term Goals: (in 3 weeks)  1) Patient will be independent in HEP  2) Decrease pain in R elbow/forearm to no more than 2-3/10 worst in ADL/IADL's  3) Increase AROM in R elbow extension by 8-10 degrees for improved functioning in ADL/IADL's  4) Increase AROM off R forearm supination by 10-15 degrees for improved functioning in ADL/IADL's  5) Assess  strength  6) Decrease edema in R elbow by .3-.4 cm      Long Term Goals: (in 6 weeks)  1) Decrease pain in R elbow/forearm to no more than 0-1/10 worst in ADL/IADL's  2) Increase AROM of R elbow/forearm, in all functional planes of mobility, to WFL for increased functioning in ADL/IADL's  3) Increase strength in R elbow, flexion/extension, to 4+/5 for improved functioning in ADL/IADL's  4) Increase R  strength by 15-20% of initial measures for improved functioning in ADL/IADL's.   5) Increased functioning in ADL/IADL's, as evidenced by a FOTO impairment rating of no more than 34%  5) Decrease edema in R elbow/forearm to trace or none    Plan     Plan of Care Certification:  05/30/23 to 07/10/23.      Outpatient Occupational Therapy 2 times weekly for 6 weeks to include the following interventions: Paraffin, Fluidotherapy, Manual therapy/joint mobilizations, Modalities for pain management, US 3 mhz, Therapeutic exercises/activities., Strengthening, Orthotic  Fabrication/Fit/Training, Edema Control, Scar Management, Electrical Modalities, and Joint Protection.    Updates/Grading for next session: progress therapy program, per protocol and MD orders, as tolerated.     Too Saha, OT

## 2023-06-28 ENCOUNTER — CLINICAL SUPPORT (OUTPATIENT)
Dept: REHABILITATION | Facility: HOSPITAL | Age: 48
End: 2023-06-28
Payer: COMMERCIAL

## 2023-06-28 DIAGNOSIS — M25.621 DECREASED RANGE OF MOTION OF RIGHT ELBOW: ICD-10-CM

## 2023-06-28 DIAGNOSIS — M25.521 RIGHT ELBOW PAIN: Primary | ICD-10-CM

## 2023-06-28 DIAGNOSIS — M62.81 MUSCLE WEAKNESS: ICD-10-CM

## 2023-06-28 PROCEDURE — 97035 APP MDLTY 1+ULTRASOUND EA 15: CPT | Mod: PN

## 2023-06-28 PROCEDURE — 97140 MANUAL THERAPY 1/> REGIONS: CPT | Mod: PN

## 2023-06-28 PROCEDURE — 97110 THERAPEUTIC EXERCISES: CPT | Mod: PN

## 2023-06-28 NOTE — PROGRESS NOTES
[facility]  Occupational Therapy Treatment Note     Date: 6/28/2023  Name: Shiraz Pillai  Clinic Number: [id number]    Therapy Diagnosis:   Encounter Diagnoses   Name Primary?    Right elbow pain Yes    Decreased range of motion of right elbow     Muscle weakness       Physician: Johnny Álvarez MD    Physician Orders: Please begin therapy to the right elbow hand and wrist.  Include gentle range of motion.  Please limit weight-bearing to less than 1 lb    Frequency:  2-3 times per week   Duration:  4-6 weeks    Medical Diagnosis: S46.211D (ICD-10-CM) - Rupture of right distal biceps tendon, subsequent encounter; Status post right distal biceps tendon repair   Surgical Procedure and Date: PROCEDURE:  Right distal biceps tendon repair , 04/25/23  Evaluation Date: 5/30/2023  Insurance Authorization Period Expiration: 12/31/23  Plan of Care Certification Period: 05/30/23 to 07/10/23  Date of Return to MD: 06/21/23  Visit # / Visits authorized: 4 / 20  FOTO: 1/3  Initial-69% / Goal=34%    Time In: 4:30 pm  Time Out: 5:20 pm  Total Billable Time: 50 minutes  (1US, 1MT, 2TE)    Subjective     Pt reports: he continues to limit lifting anything heavy, but was able to lift a gallon of milk without difficulty.   He was compliant with home exercise program given last session.   Response to previous treatment: decreased edema; less scar tissue  Functional change: patient able to hold and manipulate light to moderate weighted objects with his R hand without difficulty    Pain: 0/10  Location: right elbow/forearm      Objective     Objective Measures updated at progress report unless specified.    Treatment     Patient is 9 weeks post op distal Biceps repair    Shiraz received the treatments listed below:     Direct contact modalities after being cleared for contraindications: 1.0 MHz, 1.0 W/cm2, continuous, 10 min to R elbow/forearm surgical site, to decrease pain & edema, increase  circulation and tissue extensibility     Manual therapy techniques: Myofacial release and Soft tissue Mobilization were applied to the: R upper arm/elbow/forearm for 10 minutes, including:  - use of hand techniques, cupping and IASTM tools to increase blood flow/circulation, improve soft tissue pliability and decrease pain.     Therapeutic exercises to develop ROM and flexibility for 30 minutes, including:  Strengthening::    UBE-forward & backwards 8 min, level 4.0   Triceps, overhead 2/20 reps, 7# wt    Biceps 3 ways 2/10 reps. 7#, short arc   Forearm supination/pronation 2/10 reps, 2# wt, held at end   Wrist 3 ways over wedge 20 reps, 5# wt   PHG 3/10 setting 2 (61#), red spring   Smiles & Frowns 2/10 reps each, green t-bar   Wrist Revs-2 ways 2/10 reps each way, green t-bar   Wall Push-ups-sagittal & frontal 2/10 reps   Forearm supination stretch 3 reps, 30 sec hold        Patient Education and Home Exercises     Education provided:   - Reviewed Home Exercise Program, including shoulder and triceps isometric exercises and T-putty    - Discussed activity restrictions/limitations and avoidance of repetitive strain in ADL/IADL's to address symptoms of De'quervain's  - Discussed activity restrictions and limitations in post op healing phase  - Progress towards goals     Written Home Exercises Provided: Patient instructed to cont prior HEP and instructed to perform added exercises per recommendation  Exercises were reviewed and Shiraz was able to demonstrate them prior to the end of the session.  Shiraz demonstrated good  understanding of the HEP provided. See EMR under Patient Instructions for exercises provided during therapy sessions.       Assessment     Continued progression of Therapeutic exercises today with patient able to perform all above listed therapeutic exercises without increased pain or difficulty .  Scar tissue is minimal and without adhesions.  Pain is none.  Recommend to progress strengthening,  as tolerated         Shiraz is progressing well towards his goals and there are no updates to goals at this time. Pt prognosis is Good.     Pt will continue to benefit from skilled outpatient occupational therapy to address the deficits listed in the problem list on initial evaluation provide pt/family education and to maximize pt's level of independence in the home and community environment.     Pt's spiritual, cultural and educational needs considered and pt agreeable to plan of care and goals.    Anticipated barriers to occupational therapy: comorbid diagnosis listed above, compliance with HEP and attendance to therapy as recommended    Goals:  Short Term Goals: (in 3 weeks)  1) Patient will be independent in HEP  2) Decrease pain in R elbow/forearm to no more than 2-3/10 worst in ADL/IADL's  3) Increase AROM in R elbow extension by 8-10 degrees for improved functioning in ADL/IADL's  4) Increase AROM off R forearm supination by 10-15 degrees for improved functioning in ADL/IADL's  5) Assess  strength  6) Decrease edema in R elbow by .3-.4 cm      Long Term Goals: (in 6 weeks)  1) Decrease pain in R elbow/forearm to no more than 0-1/10 worst in ADL/IADL's  2) Increase AROM of R elbow/forearm, in all functional planes of mobility, to WFL for increased functioning in ADL/IADL's  3) Increase strength in R elbow, flexion/extension, to 4+/5 for improved functioning in ADL/IADL's  4) Increase R  strength by 15-20% of initial measures for improved functioning in ADL/IADL's.   5) Increased functioning in ADL/IADL's, as evidenced by a FOTO impairment rating of no more than 34%  5) Decrease edema in R elbow/forearm to trace or none    Plan     Plan of Care Certification:  05/30/23 to 07/10/23.      Outpatient Occupational Therapy 2 times weekly for 6 weeks to include the following interventions: Paraffin, Fluidotherapy, Manual therapy/joint mobilizations, Modalities for pain management, US 3 mhz, Therapeutic  exercises/activities., Strengthening, Orthotic Fabrication/Fit/Training, Edema Control, Scar Management, Electrical Modalities, and Joint Protection.    Updates/Grading for next session: progress therapy program, per protocol and MD orders, as tolerated.     Too Saha, OT

## 2023-07-03 ENCOUNTER — CLINICAL SUPPORT (OUTPATIENT)
Dept: REHABILITATION | Facility: HOSPITAL | Age: 48
End: 2023-07-03
Payer: COMMERCIAL

## 2023-07-03 DIAGNOSIS — M25.521 RIGHT ELBOW PAIN: Primary | ICD-10-CM

## 2023-07-03 DIAGNOSIS — M25.621 DECREASED RANGE OF MOTION OF RIGHT ELBOW: ICD-10-CM

## 2023-07-03 DIAGNOSIS — M62.81 MUSCLE WEAKNESS: ICD-10-CM

## 2023-07-03 PROCEDURE — 97140 MANUAL THERAPY 1/> REGIONS: CPT | Mod: PN

## 2023-07-03 PROCEDURE — 97035 APP MDLTY 1+ULTRASOUND EA 15: CPT | Mod: PN

## 2023-07-03 PROCEDURE — 97110 THERAPEUTIC EXERCISES: CPT | Mod: PN

## 2023-07-03 NOTE — PROGRESS NOTES
[facility]  Occupational Therapy Treatment Note     Date: 7/3/2023  Name: Shiraz Pillai  Clinic Number: [id number]    Therapy Diagnosis:   Encounter Diagnoses   Name Primary?    Right elbow pain Yes    Decreased range of motion of right elbow     Muscle weakness       Physician: Johnny Álvarez MD    Physician Orders: Please begin therapy to the right elbow hand and wrist.  Include gentle range of motion.  Please limit weight-bearing to less than 1 lb    Frequency:  2-3 times per week   Duration:  4-6 weeks    Medical Diagnosis: S46.211D (ICD-10-CM) - Rupture of right distal biceps tendon, subsequent encounter; Status post right distal biceps tendon repair   Surgical Procedure and Date: PROCEDURE:  Right distal biceps tendon repair , 04/25/23  Evaluation Date: 5/30/2023  Insurance Authorization Period Expiration: 12/31/23  Plan of Care Certification Period: 05/30/23 to 07/10/23  Date of Return to MD: 06/21/23  FOTO: 1/3  Initial-69% / Goal=34%    Visit # / Visits authorized: 7 / 20    Time In: 4:35 pm  Time Out: 5:45 pm  Total Billable Time: 70 minutes  (1US, 1MT, 3TE)    Subjective     Pt reports: No pain in R elbow/forearm after resuming moderate resistive ADL/IADL's. He still complains of pain over the radial aspect of his R forearm/wrist  He was compliant with home exercise program given last session.   Response to previous treatment: decreased edema; less scar tissue  Functional change: patient able to hold and manipulate light to moderate weighted objects with his R hand without difficulty    Pain: 0/10  Location: right elbow/forearm      Objective     Objective Measures updated at progress report unless specified.    Treatment     Patient is 10 weeks post op distal Biceps repair    Shiraz received the treatments listed below:     Direct contact modalities after being cleared for contraindications: 1.0 MHz, 1.0 W/cm2, continuous, 10 min to R elbow/forearm surgical  site, to decrease pain & edema, increase circulation and tissue extensibility     Manual therapy techniques: Myofacial release and Soft tissue Mobilization were applied to the: R upper arm/elbow/forearm for 15 minutes, including:  - use of hand techniques, cupping and IASTM tools to increase blood flow/circulation, improve soft tissue pliability and decrease pain.     Therapeutic exercises to develop ROM and flexibility for 42 minutes, including:  Strengthening::    UBE-forward & backwards 8 min, level 4.0   Triceps, overhead 2/20 reps, 7# wt    Biceps 3 ways 2/10 reps. 7#, short arc   Forearm supination/pronation 2/10 reps, 2# wt, held at end   Wrist 2 ways over wedge 20 reps, 5# wt   PHG 3/10 setting 2 (61#), red spring   Smiles & Frowns 2/10 reps each, green t-bar   Wrist Revs-2 ways 2/10 reps each way, green t-bar   Wall Push-ups-sagittal & frontal 2/10 reps   Forearm supination stretch 3 reps, 30 sec hold   Bench press 15 reps, 45# bar only   Overhead carry 20 ft. 25# wt, (trial)      Ice massage to R forearm, intersection, for 3 minutes to decrease pain and inflammation.     Patient Education and Home Exercises     Education provided:   - Added T-band Biceps and Triceps exercises to Home Exercise Program  - Reviewed Home Exercise Program, including shoulder and triceps isometric exercises and T-putty    - Discussed activity restrictions/limitations and avoidance of repetitive strain in ADL/IADL's to address symptoms of De'quervain's  - Discussed activity restrictions and limitations in post op healing phase  - Progress towards goals     Written Home Exercises Provided: Patient instructed to cont prior HEP and instructed to perform added exercises per recommendation  Exercises were reviewed and Shiraz was able to demonstrate them prior to the end of the session.  Shiraz demonstrated good  understanding of the HEP provided. See EMR under Patient Instructions for exercises provided during therapy sessions.        Assessment     Continued progression of Therapeutic exercises today with patient able to perform all above listed therapeutic exercises without increased pain or difficulty.  Scar tissue is minimal and without adhesions.  Pain is none in the R elbow.  Patient is having pain in his R forearm at the Intersection, but improves with manual therapy and ice massage.  Recommend to progress strengthening, as tolerated         Shiraz is progressing well towards his goals and there are no updates to goals at this time. Pt prognosis is Good.     Pt will continue to benefit from skilled outpatient occupational therapy to address the deficits listed in the problem list on initial evaluation provide pt/family education and to maximize pt's level of independence in the home and community environment.     Pt's spiritual, cultural and educational needs considered and pt agreeable to plan of care and goals.    Anticipated barriers to occupational therapy: comorbid diagnosis listed above, compliance with HEP and attendance to therapy as recommended    Goals:  Short Term Goals: (in 3 weeks)  1) Patient will be independent in HEP  2) Decrease pain in R elbow/forearm to no more than 2-3/10 worst in ADL/IADL's  3) Increase AROM in R elbow extension by 8-10 degrees for improved functioning in ADL/IADL's  4) Increase AROM off R forearm supination by 10-15 degrees for improved functioning in ADL/IADL's  5) Assess  strength  6) Decrease edema in R elbow by .3-.4 cm      Long Term Goals: (in 6 weeks)  1) Decrease pain in R elbow/forearm to no more than 0-1/10 worst in ADL/IADL's  2) Increase AROM of R elbow/forearm, in all functional planes of mobility, to WFL for increased functioning in ADL/IADL's  3) Increase strength in R elbow, flexion/extension, to 4+/5 for improved functioning in ADL/IADL's  4) Increase R  strength by 15-20% of initial measures for improved functioning in ADL/IADL's.   5) Increased functioning in ADL/IADL's,  as evidenced by a FOTO impairment rating of no more than 34%  5) Decrease edema in R elbow/forearm to trace or none    Plan     Plan of Care Certification:  05/30/23 to 07/10/23.      Outpatient Occupational Therapy 2 times weekly for 6 weeks to include the following interventions: Paraffin, Fluidotherapy, Manual therapy/joint mobilizations, Modalities for pain management, US 3 mhz, Therapeutic exercises/activities., Strengthening, Orthotic Fabrication/Fit/Training, Edema Control, Scar Management, Electrical Modalities, and Joint Protection.    Updates/Grading for next session: progress therapy program, per protocol and MD orders, as tolerated.     Too Saha, OT

## 2023-07-10 ENCOUNTER — CLINICAL SUPPORT (OUTPATIENT)
Dept: REHABILITATION | Facility: HOSPITAL | Age: 48
End: 2023-07-10
Payer: COMMERCIAL

## 2023-07-10 DIAGNOSIS — M62.81 MUSCLE WEAKNESS: ICD-10-CM

## 2023-07-10 DIAGNOSIS — M25.621 DECREASED RANGE OF MOTION OF RIGHT ELBOW: ICD-10-CM

## 2023-07-10 DIAGNOSIS — M25.521 RIGHT ELBOW PAIN: Primary | ICD-10-CM

## 2023-07-10 PROCEDURE — 97140 MANUAL THERAPY 1/> REGIONS: CPT | Mod: PN

## 2023-07-10 PROCEDURE — 97110 THERAPEUTIC EXERCISES: CPT | Mod: PN

## 2023-07-10 NOTE — PROGRESS NOTES
[facility] OUTPATIENT THERAPY AND WELLNESS  Occupational Therapy Treatment Note     Date: 7/10/2023  Name: Shiraz Pillai  Clinic Number: [id number]    Therapy Diagnosis:   Encounter Diagnoses   Name Primary?    Right elbow pain Yes    Decreased range of motion of right elbow     Muscle weakness         Physician: Johnny Álvarez MD    Physician Orders: Please begin therapy to the right elbow hand and wrist.  Include gentle range of motion.  Please limit weight-bearing to less than 1 lb    Frequency:  2-3 times per week   Duration:  4-6 weeks    Medical Diagnosis: S46.211D (ICD-10-CM) - Rupture of right distal biceps tendon, subsequent encounter; Status post right distal biceps tendon repair   Surgical Procedure and Date: PROCEDURE:  Right distal biceps tendon repair , 04/25/23  Evaluation Date: 5/30/2023  Insurance Authorization Period Expiration: 12/31/23  Plan of Care Certification Period: 05/30/23 to 07/10/23  Date of Return to MD: 0/21/23  FOTO: 1/3  Initial-69% / Goal=34%    Visit # / Visits authorized: 8 / 20    Time In: 4:30 pm  Time Out: 5:15 pm  Total Billable Time: 45 minutes  (1MT, 2TE)    Subjective     Pt reports: No pain in R elbow/forearm after performing the advances therapeutic exercises today.  He is wanting to return to [employer] hunting in the fall.   Response to previous treatment: decreased edema; less scar tissue; increased strength and activity tolerance  Functional change: patient able to hold and manipulate moderate to heavy weighted objects with his R hand without difficulty    Pain: 0/10  Location: right elbow/forearm      Objective     Objective Measures updated at progress report unless specified.    Treatment     Patient is 10 weeks post op distal Biceps repair    Shiraz received the treatments listed below:     Manual therapy techniques: Myofacial release and Soft tissue Mobilization were applied to the: R forearm for 10 minutes, including:  - use of hand techniques, cupping  "and IASTM tools to increase blood flow/circulation, improve soft tissue pliability and decrease pain.   - Kinesiotapin "I" strips applied in a cross pattern over the intersection of the R forearm for pain and soft tissue management.       Therapeutic exercises to develop ROM and flexibility for 35 minutes, including:  Strengthening::    UBE-forward & backwards 8 min, level 4.0   Triceps, overhead 2/20 reps, 7# wt    Biceps 3 ways 2/15 reps. 7#, short arc   Forearm supination/pronation 2/15 reps, 2# wt, held at end   Wrist 2 ways over wedge 20 reps, 5# wt   PHG 5/10 setting 2 (61#), red spring   Smiles & Frowns 2/15 reps each, green t-bar   Wrist Revs-2 ways 2/15 reps each way, green t-bar   Modified Push-ups-sagittal  15 reps   Forearm supination stretch 3 reps, 30 sec hold   R scapular retraction (bow pull) 2/10 reps, 27# (Free Motion)        Patient Education and Home Exercises     Education provided:   - Reviewed Home Exercise Program  - Progress towards goals     Written Home Exercises Provided: Patient instructed to cont prior HEP and instructed to perform added exercises per recommendation  Exercises were reviewed and Shiraz was able to demonstrate them prior to the end of the session.  Shiraz demonstrated good  understanding of the HEP provided. See EMR under Patient Instructions for exercises provided during therapy sessions.       Assessment     Pain has resolved at surgical site, however, patient continues to have pain over the R forearm at the intersection. Advanced Therapeutic exercises further today without any increase in pain or difficulty. Recommend to progress strengthening, as tolerated         Shiraz is progressing well towards his goals and there are no updates to goals at this time. Pt prognosis is Good.     Pt will continue to benefit from skilled outpatient occupational therapy to address the deficits listed in the problem list on initial evaluation provide pt/family education and to " maximize pt's level of independence in the home and community environment.     Pt's spiritual, cultural and educational needs considered and pt agreeable to plan of care and goals.    Anticipated barriers to occupational therapy: comorbid diagnosis listed above, compliance with HEP and attendance to therapy as recommended    Goals:  Short Term Goals: (in 3 weeks)  1) Patient will be independent in HEP  2) Decrease pain in R elbow/forearm to no more than 2-3/10 worst in ADL/IADL's  3) Increase AROM in R elbow extension by 8-10 degrees for improved functioning in ADL/IADL's  4) Increase AROM off R forearm supination by 10-15 degrees for improved functioning in ADL/IADL's  5) Assess  strength  6) Decrease edema in R elbow by .3-.4 cm      Long Term Goals: (in 6 weeks)  1) Decrease pain in R elbow/forearm to no more than 0-1/10 worst in ADL/IADL's  2) Increase AROM of R elbow/forearm, in all functional planes of mobility, to WFL for increased functioning in ADL/IADL's  3) Increase strength in R elbow, flexion/extension, to 4+/5 for improved functioning in ADL/IADL's  4) Increase R  strength by 15-20% of initial measures for improved functioning in ADL/IADL's.   5) Increased functioning in ADL/IADL's, as evidenced by a FOTO impairment rating of no more than 34%  5) Decrease edema in R elbow/forearm to trace or none    Plan     Plan of Care Certification:  05/30/23 to 07/10/23.      Outpatient Occupational Therapy 2 times weekly for 6 weeks to include the following interventions: Paraffin, Fluidotherapy, Manual therapy/joint mobilizations, Modalities for pain management, US 3 mhz, Therapeutic exercises/activities., Strengthening, Orthotic Fabrication/Fit/Training, Edema Control, Scar Management, Electrical Modalities, and Joint Protection.    Updates/Grading for next session: progress strengthening, as tolerated.     Too Saha, OT

## 2023-07-12 ENCOUNTER — CLINICAL SUPPORT (OUTPATIENT)
Dept: REHABILITATION | Facility: HOSPITAL | Age: 48
End: 2023-07-12
Payer: COMMERCIAL

## 2023-07-12 DIAGNOSIS — M25.521 RIGHT ELBOW PAIN: Primary | ICD-10-CM

## 2023-07-12 DIAGNOSIS — M62.81 MUSCLE WEAKNESS: ICD-10-CM

## 2023-07-12 DIAGNOSIS — M25.621 DECREASED RANGE OF MOTION OF RIGHT ELBOW: ICD-10-CM

## 2023-07-12 PROCEDURE — 97140 MANUAL THERAPY 1/> REGIONS: CPT | Mod: PN

## 2023-07-12 PROCEDURE — 97110 THERAPEUTIC EXERCISES: CPT | Mod: PN

## 2023-07-12 PROCEDURE — 97035 APP MDLTY 1+ULTRASOUND EA 15: CPT | Mod: PN

## 2023-07-12 NOTE — PROGRESS NOTES
[facility] OUTPATIENT THERAPY AND WELLNESS  Occupational Therapy Treatment Note     Date: 7/12/2023  Name: Shiraz Pillai  Clinic Number: [id number]    Therapy Diagnosis:   Encounter Diagnoses   Name Primary?    Right elbow pain Yes    Decreased range of motion of right elbow     Muscle weakness         Physician: Johnny Álvarez MD    Physician Orders: Please begin therapy to the right elbow hand and wrist.  Include gentle range of motion.  Please limit weight-bearing to less than 1 lb    Frequency:  2-3 times per week   Duration:  4-6 weeks    Medical Diagnosis: S46.211D (ICD-10-CM) - Rupture of right distal biceps tendon, subsequent encounter; Status post right distal biceps tendon repair   Surgical Procedure and Date: PROCEDURE:  Right distal biceps tendon repair , 04/25/23  Evaluation Date: 5/30/2023  Insurance Authorization Period Expiration: 12/31/23  Plan of Care Certification Period: 05/30/23 to 07/10/23  Date of Return to MD: 0/21/23  FOTO: 1/3  Initial-69% / Goal=34%    Visit # / Visits authorized: 8 / 20    Time In: 4:30 pm  Time Out: 5:30 pm  Total Billable Time: 60 minutes  (1US, 1MT, 2TE)    Subjective     Pt reports: he continues to have some pain at the R forearm intersection, but he feels it is improving.    Response to previous treatment: decreased edema; less scar tissue; increased strength and activity tolerance  Functional change: patient able to hold and manipulate moderate to heavy weighted objects with his R hand without difficulty    Pain: 0/10  Location: right elbow/forearm      Objective     Objective Measures updated at progress report unless specified.    Treatment     Patient is 10 weeks post op distal Biceps repair    Shiraz received the treatments listed below:     Direct contact modalities after being cleared for contraindications for 10 minutes, as follows:  - 3.0 MHz, 1.0W/cm2, continuous, 10 min to R dorsal forearm, to decrease pain & edema, increase circulation and  "tissue extensibility     Manual therapy techniques: Myofacial release and Soft tissue Mobilization were applied to the: R forearm for 18 minutes, including:  - use of hand techniques, cupping and IASTM tools to increase blood flow/circulation, improve soft tissue pliability and decrease pain.   - Kinesiotapin "I" strips applied in a cross pattern over the intersection of the R forearm for pain and soft tissue management.       Therapeutic exercises to develop ROM and flexibility for 32 minutes, including:  Strengthening::    UBE-forward & backwards 8 min, level 4.0   Triceps, overhead 2/20 reps, 7# wt    Biceps 3 ways 2/15 reps. 7#, short arc   PHG /10 setting 2 (61#), red spring   Smiles & Frowns 3/15 reps each, green t-bar   Wrist Revs-2 ways 2/15 reps each way, green t-bar   Forearm supination stretch 3 reps, 30 sec hold        Patient Education and Home Exercises     Education provided:   - Reviewed Home Exercise Program  - Progress towards goals     Written Home Exercises Provided: Patient instructed to cont prior HEP and instructed to perform added exercises per recommendation  Exercises were reviewed and Shiraz was able to demonstrate them prior to the end of the session.  Shiraz demonstrated good  understanding of the HEP provided. See EMR under Patient Instructions for exercises provided during therapy sessions.       Assessment     Patient continues to have some pain over the R forearm at the intersection, however, is improving with OT intervention.  Patient was able to perform all above listed therapeutic exercises without increased pain or difficulty today. Recommend to progress strengthening, as tolerated         Shiraz is progressing well towards his goals and there are no updates to goals at this time. Pt prognosis is Good.     Pt will continue to benefit from skilled outpatient occupational therapy to address the deficits listed in the problem list on initial evaluation provide pt/family " education and to maximize pt's level of independence in the home and community environment.     Pt's spiritual, cultural and educational needs considered and pt agreeable to plan of care and goals.    Anticipated barriers to occupational therapy: comorbid diagnosis listed above, compliance with HEP and attendance to therapy as recommended    Goals:  Short Term Goals: (in 3 weeks)  1) Patient will be independent in HEP  2) Decrease pain in R elbow/forearm to no more than 2-3/10 worst in ADL/IADL's  3) Increase AROM in R elbow extension by 8-10 degrees for improved functioning in ADL/IADL's  4) Increase AROM off R forearm supination by 10-15 degrees for improved functioning in ADL/IADL's  5) Assess  strength  6) Decrease edema in R elbow by .3-.4 cm      Long Term Goals: (in 6 weeks)  1) Decrease pain in R elbow/forearm to no more than 0-1/10 worst in ADL/IADL's  2) Increase AROM of R elbow/forearm, in all functional planes of mobility, to WFL for increased functioning in ADL/IADL's  3) Increase strength in R elbow, flexion/extension, to 4+/5 for improved functioning in ADL/IADL's  4) Increase R  strength by 15-20% of initial measures for improved functioning in ADL/IADL's.   5) Increased functioning in ADL/IADL's, as evidenced by a FOTO impairment rating of no more than 34%  5) Decrease edema in R elbow/forearm to trace or none    Plan     Plan of Care Certification:  05/30/23 to 07/10/23.      Outpatient Occupational Therapy 2 times weekly for 6 weeks to include the following interventions: Paraffin, Fluidotherapy, Manual therapy/joint mobilizations, Modalities for pain management, US 3 mhz, Therapeutic exercises/activities., Strengthening, Orthotic Fabrication/Fit/Training, Edema Control, Scar Management, Electrical Modalities, and Joint Protection.    Updates/Grading for next session: progress strengthening, as tolerated.     Too Saha, OT

## 2023-07-19 ENCOUNTER — OFFICE VISIT (OUTPATIENT)
Dept: ORTHOPEDICS | Facility: CLINIC | Age: 48
End: 2023-07-19
Payer: COMMERCIAL

## 2023-07-19 DIAGNOSIS — S46.211D RUPTURE OF RIGHT DISTAL BICEPS TENDON, SUBSEQUENT ENCOUNTER: Primary | ICD-10-CM

## 2023-07-19 PROCEDURE — 99999 PR PBB SHADOW E&M-EST. PATIENT-LVL II: ICD-10-PCS | Mod: PBBFAC,,, | Performed by: ORTHOPAEDIC SURGERY

## 2023-07-19 PROCEDURE — 99024 POSTOP FOLLOW-UP VISIT: CPT | Mod: S$GLB,,, | Performed by: ORTHOPAEDIC SURGERY

## 2023-07-19 PROCEDURE — 1159F MED LIST DOCD IN RCRD: CPT | Mod: CPTII,S$GLB,, | Performed by: ORTHOPAEDIC SURGERY

## 2023-07-19 PROCEDURE — 99999 PR PBB SHADOW E&M-EST. PATIENT-LVL II: CPT | Mod: PBBFAC,,, | Performed by: ORTHOPAEDIC SURGERY

## 2023-07-19 PROCEDURE — 1159F PR MEDICATION LIST DOCUMENTED IN MEDICAL RECORD: ICD-10-PCS | Mod: CPTII,S$GLB,, | Performed by: ORTHOPAEDIC SURGERY

## 2023-07-19 PROCEDURE — 99024 PR POST-OP FOLLOW-UP VISIT: ICD-10-PCS | Mod: S$GLB,,, | Performed by: ORTHOPAEDIC SURGERY

## 2023-07-19 NOTE — PROGRESS NOTES
Mr Pillai returns to clinic today.  Has a history of right distal biceps tendon repair.  He is approximately 3 months from the repair.  He is doing well.      Physical exam:  Examination the right forearm and elbow reveals that the incision is well healed.  There is no edema.  He does have a palpable intact biceps tendon.  Range of motion of the elbow is 0-150 degrees with full pronation and supination.  Two was a very small area of numbness over the palm of the hand but has grossly intact sensation throughout the remainder of the median radial ulnar distributions.  He does have a 2+ radial pulse     Assessment: Status post right distal biceps tendon repair     Plan:    1. He is allowed to begin increasing activity as tolerated but she did have a 15-20 lb weight limit to the right hand    2. He can continue to work with therapy feels that is beneficial.  He can begin to wean away from therapy at any point he decides it is not significantly beneficial     3. If he has any further problems with the elbow including increasing pain difficulty flexing the elbow or any other concerns he will follow up with me    4. At the 6 month shaun he is released to any activity he would like to do.

## 2023-08-10 ENCOUNTER — PATIENT MESSAGE (OUTPATIENT)
Dept: ORTHOPEDICS | Facility: CLINIC | Age: 48
End: 2023-08-10
Payer: COMMERCIAL

## 2023-09-29 ENCOUNTER — OFFICE VISIT (OUTPATIENT)
Dept: FAMILY MEDICINE | Facility: CLINIC | Age: 48
End: 2023-09-29
Payer: COMMERCIAL

## 2023-09-29 VITALS
BODY MASS INDEX: 33.15 KG/M2 | WEIGHT: 223.81 LBS | SYSTOLIC BLOOD PRESSURE: 114 MMHG | HEIGHT: 69 IN | HEART RATE: 78 BPM | DIASTOLIC BLOOD PRESSURE: 74 MMHG | TEMPERATURE: 98 F | OXYGEN SATURATION: 95 %

## 2023-09-29 DIAGNOSIS — M79.604 PAIN OF RIGHT LOWER EXTREMITY: Primary | ICD-10-CM

## 2023-09-29 PROCEDURE — 3074F PR MOST RECENT SYSTOLIC BLOOD PRESSURE < 130 MM HG: ICD-10-PCS | Mod: CPTII,S$GLB,, | Performed by: NURSE PRACTITIONER

## 2023-09-29 PROCEDURE — 99999 PR PBB SHADOW E&M-EST. PATIENT-LVL IV: CPT | Mod: PBBFAC,,, | Performed by: NURSE PRACTITIONER

## 2023-09-29 PROCEDURE — 3074F SYST BP LT 130 MM HG: CPT | Mod: CPTII,S$GLB,, | Performed by: NURSE PRACTITIONER

## 2023-09-29 PROCEDURE — 3078F PR MOST RECENT DIASTOLIC BLOOD PRESSURE < 80 MM HG: ICD-10-PCS | Mod: CPTII,S$GLB,, | Performed by: NURSE PRACTITIONER

## 2023-09-29 PROCEDURE — 99213 PR OFFICE/OUTPT VISIT, EST, LEVL III, 20-29 MIN: ICD-10-PCS | Mod: S$GLB,,, | Performed by: NURSE PRACTITIONER

## 2023-09-29 PROCEDURE — 1160F PR REVIEW ALL MEDS BY PRESCRIBER/CLIN PHARMACIST DOCUMENTED: ICD-10-PCS | Mod: CPTII,S$GLB,, | Performed by: NURSE PRACTITIONER

## 2023-09-29 PROCEDURE — 3008F BODY MASS INDEX DOCD: CPT | Mod: CPTII,S$GLB,, | Performed by: NURSE PRACTITIONER

## 2023-09-29 PROCEDURE — 1159F MED LIST DOCD IN RCRD: CPT | Mod: CPTII,S$GLB,, | Performed by: NURSE PRACTITIONER

## 2023-09-29 PROCEDURE — 1159F PR MEDICATION LIST DOCUMENTED IN MEDICAL RECORD: ICD-10-PCS | Mod: CPTII,S$GLB,, | Performed by: NURSE PRACTITIONER

## 2023-09-29 PROCEDURE — 3078F DIAST BP <80 MM HG: CPT | Mod: CPTII,S$GLB,, | Performed by: NURSE PRACTITIONER

## 2023-09-29 PROCEDURE — 1160F RVW MEDS BY RX/DR IN RCRD: CPT | Mod: CPTII,S$GLB,, | Performed by: NURSE PRACTITIONER

## 2023-09-29 PROCEDURE — 99213 OFFICE O/P EST LOW 20 MIN: CPT | Mod: S$GLB,,, | Performed by: NURSE PRACTITIONER

## 2023-09-29 PROCEDURE — 3008F PR BODY MASS INDEX (BMI) DOCUMENTED: ICD-10-PCS | Mod: CPTII,S$GLB,, | Performed by: NURSE PRACTITIONER

## 2023-09-29 PROCEDURE — 99999 PR PBB SHADOW E&M-EST. PATIENT-LVL IV: ICD-10-PCS | Mod: PBBFAC,,, | Performed by: NURSE PRACTITIONER

## 2023-09-29 RX ORDER — SULINDAC 200 MG/1
200 TABLET ORAL 2 TIMES DAILY
Qty: 14 TABLET | Refills: 0 | Status: SHIPPED | OUTPATIENT
Start: 2023-09-29

## 2023-09-29 RX ORDER — METHOCARBAMOL 750 MG/1
750 TABLET, FILM COATED ORAL 3 TIMES DAILY PRN
Qty: 30 TABLET | Refills: 0 | Status: SHIPPED | OUTPATIENT
Start: 2023-09-29

## 2023-09-29 RX ORDER — PREDNISONE 20 MG/1
20 TABLET ORAL 2 TIMES DAILY
Qty: 10 TABLET | Refills: 0 | Status: SHIPPED | OUTPATIENT
Start: 2023-09-29 | End: 2023-10-04

## 2023-11-11 NOTE — PROGRESS NOTES
Subjective:       Patient ID: Shiraz Pillai is a 48 y.o. male.    Chief Complaint: Leg Pain    Leg Pain   The incident occurred more than 1 week ago. There was no injury mechanism. The pain is present in the right thigh and right leg. The quality of the pain is described as aching and cramping. The pain is moderate. The pain has been Constant since onset. The symptoms are aggravated by weight bearing. He has tried acetaminophen for the symptoms. The treatment provided no relief.       Review of Systems   Constitutional:  Negative for fatigue, fever and unexpected weight change.   HENT:  Negative for ear pain and sore throat.    Eyes: Negative.  Negative for pain and visual disturbance.   Respiratory:  Negative for cough and shortness of breath.    Cardiovascular:  Negative for chest pain and palpitations.   Gastrointestinal:  Negative for abdominal pain, diarrhea, nausea and vomiting.   Genitourinary:  Negative for dysuria and frequency.   Musculoskeletal:  Negative for arthralgias and myalgias.   Skin:  Negative for color change and rash.   Neurological:  Negative for dizziness and headaches.   Psychiatric/Behavioral:  Negative for sleep disturbance. The patient is not nervous/anxious.        Vitals:    09/29/23 1010   BP: 114/74   Pulse: 78   Temp: 97.9 °F (36.6 °C)       Objective:     Current Outpatient Medications   Medication Sig Dispense Refill    methocarbamoL (ROBAXIN) 750 MG Tab Take 1 tablet (750 mg total) by mouth 3 (three) times daily as needed (muscle pain). 30 tablet 0    sulindac (CLINORIL) 200 MG Tab Take 1 tablet (200 mg total) by mouth 2 (two) times daily. 14 tablet 0     No current facility-administered medications for this visit.       Physical Exam  Constitutional:       Appearance: He is well-developed.   HENT:      Head: Normocephalic.   Pulmonary:      Effort: Pulmonary effort is normal. No respiratory distress.   Musculoskeletal:      Cervical back: Normal range of motion.       Right upper leg: Normal.      Right lower leg: Normal. No swelling. No edema.   Neurological:      Mental Status: He is alert and oriented to person, place, and time.   Psychiatric:         Thought Content: Thought content normal.         Judgment: Judgment normal.         Assessment:       1. Pain of right lower extremity        Plan:   Pain of right lower extremity    Other orders  -     methocarbamoL (ROBAXIN) 750 MG Tab; Take 1 tablet (750 mg total) by mouth 3 (three) times daily as needed (muscle pain).  Dispense: 30 tablet; Refill: 0  -     sulindac (CLINORIL) 200 MG Tab; Take 1 tablet (200 mg total) by mouth 2 (two) times daily.  Dispense: 14 tablet; Refill: 0  -     predniSONE (DELTASONE) 20 MG tablet; Take 1 tablet (20 mg total) by mouth 2 (two) times daily. for 5 days  Dispense: 10 tablet; Refill: 0        No follow-ups on file.    There are no Patient Instructions on file for this visit.

## 2024-01-26 ENCOUNTER — PATIENT MESSAGE (OUTPATIENT)
Dept: FAMILY MEDICINE | Facility: CLINIC | Age: 49
End: 2024-01-26
Payer: COMMERCIAL

## 2024-01-26 RX ORDER — PREDNISONE 20 MG/1
20 TABLET ORAL DAILY
Qty: 5 TABLET | Refills: 0 | Status: SHIPPED | OUTPATIENT
Start: 2024-01-26 | End: 2024-01-31

## 2024-01-26 RX ORDER — DICLOFENAC SODIUM 75 MG/1
75 TABLET, DELAYED RELEASE ORAL 2 TIMES DAILY PRN
Qty: 20 TABLET | Refills: 0 | Status: SHIPPED | OUTPATIENT
Start: 2024-01-26 | End: 2024-02-05

## 2024-04-30 ENCOUNTER — OFFICE VISIT (OUTPATIENT)
Dept: FAMILY MEDICINE | Facility: CLINIC | Age: 49
End: 2024-04-30
Payer: COMMERCIAL

## 2024-04-30 ENCOUNTER — LAB VISIT (OUTPATIENT)
Dept: LAB | Facility: HOSPITAL | Age: 49
End: 2024-04-30
Attending: NURSE PRACTITIONER
Payer: COMMERCIAL

## 2024-04-30 VITALS
TEMPERATURE: 99 F | SYSTOLIC BLOOD PRESSURE: 118 MMHG | DIASTOLIC BLOOD PRESSURE: 81 MMHG | RESPIRATION RATE: 18 BRPM | OXYGEN SATURATION: 96 % | HEIGHT: 69 IN | WEIGHT: 233.38 LBS | BODY MASS INDEX: 34.57 KG/M2 | HEART RATE: 75 BPM

## 2024-04-30 DIAGNOSIS — Z12.11 COLON CANCER SCREENING: ICD-10-CM

## 2024-04-30 DIAGNOSIS — Z13.220 SCREENING, LIPID: ICD-10-CM

## 2024-04-30 DIAGNOSIS — Z13.1 SCREENING FOR DIABETES MELLITUS: ICD-10-CM

## 2024-04-30 DIAGNOSIS — Z00.00 ANNUAL PHYSICAL EXAM: ICD-10-CM

## 2024-04-30 DIAGNOSIS — Z00.00 ANNUAL PHYSICAL EXAM: Primary | ICD-10-CM

## 2024-04-30 LAB
ALBUMIN SERPL BCP-MCNC: 4.4 G/DL (ref 3.5–5.2)
ALP SERPL-CCNC: 76 U/L (ref 55–135)
ALT SERPL W/O P-5'-P-CCNC: 34 U/L (ref 10–44)
ANION GAP SERPL CALC-SCNC: 10 MMOL/L (ref 8–16)
AST SERPL-CCNC: 24 U/L (ref 10–40)
BILIRUB SERPL-MCNC: 0.9 MG/DL (ref 0.1–1)
BUN SERPL-MCNC: 9 MG/DL (ref 6–20)
CALCIUM SERPL-MCNC: 10 MG/DL (ref 8.7–10.5)
CHLORIDE SERPL-SCNC: 103 MMOL/L (ref 95–110)
CHOLEST SERPL-MCNC: 162 MG/DL (ref 120–199)
CHOLEST/HDLC SERPL: 4.6 {RATIO} (ref 2–5)
CO2 SERPL-SCNC: 26 MMOL/L (ref 23–29)
CREAT SERPL-MCNC: 1.1 MG/DL (ref 0.5–1.4)
EST. GFR  (NO RACE VARIABLE): >60 ML/MIN/1.73 M^2
ESTIMATED AVG GLUCOSE: 111 MG/DL (ref 68–131)
GLUCOSE SERPL-MCNC: 93 MG/DL (ref 70–110)
HBA1C MFR BLD: 5.5 % (ref 4–5.6)
HDLC SERPL-MCNC: 35 MG/DL (ref 40–75)
HDLC SERPL: 21.6 % (ref 20–50)
LDLC SERPL CALC-MCNC: 99.6 MG/DL (ref 63–159)
NONHDLC SERPL-MCNC: 127 MG/DL
POTASSIUM SERPL-SCNC: 5.2 MMOL/L (ref 3.5–5.1)
PROT SERPL-MCNC: 7.9 G/DL (ref 6–8.4)
SODIUM SERPL-SCNC: 139 MMOL/L (ref 136–145)
TRIGL SERPL-MCNC: 137 MG/DL (ref 30–150)

## 2024-04-30 PROCEDURE — 1160F RVW MEDS BY RX/DR IN RCRD: CPT | Mod: CPTII,S$GLB,, | Performed by: NURSE PRACTITIONER

## 2024-04-30 PROCEDURE — 80061 LIPID PANEL: CPT | Performed by: NURSE PRACTITIONER

## 2024-04-30 PROCEDURE — 3008F BODY MASS INDEX DOCD: CPT | Mod: CPTII,S$GLB,, | Performed by: NURSE PRACTITIONER

## 2024-04-30 PROCEDURE — 36415 COLL VENOUS BLD VENIPUNCTURE: CPT | Mod: PO | Performed by: NURSE PRACTITIONER

## 2024-04-30 PROCEDURE — 83036 HEMOGLOBIN GLYCOSYLATED A1C: CPT | Performed by: NURSE PRACTITIONER

## 2024-04-30 PROCEDURE — 99396 PREV VISIT EST AGE 40-64: CPT | Mod: S$GLB,,, | Performed by: NURSE PRACTITIONER

## 2024-04-30 PROCEDURE — 3074F SYST BP LT 130 MM HG: CPT | Mod: CPTII,S$GLB,, | Performed by: NURSE PRACTITIONER

## 2024-04-30 PROCEDURE — 99999 PR PBB SHADOW E&M-EST. PATIENT-LVL IV: CPT | Mod: PBBFAC,,, | Performed by: NURSE PRACTITIONER

## 2024-04-30 PROCEDURE — 3079F DIAST BP 80-89 MM HG: CPT | Mod: CPTII,S$GLB,, | Performed by: NURSE PRACTITIONER

## 2024-04-30 PROCEDURE — 80053 COMPREHEN METABOLIC PANEL: CPT | Performed by: NURSE PRACTITIONER

## 2024-04-30 PROCEDURE — 1159F MED LIST DOCD IN RCRD: CPT | Mod: CPTII,S$GLB,, | Performed by: NURSE PRACTITIONER

## 2024-04-30 NOTE — PROGRESS NOTES
Subjective:       Patient ID: Shiraz Pillai is a 48 y.o. male.    Chief Complaint: No chief complaint on file.    HPI    He comes in for routine annual wellness exam with fasting labs.  Fasting in the office today.  He is on no long-term medications, no chronic medical problems.  He denies shortness of breath or chest pain, no dizziness or syncope, no headaches or blurry vision.  He follows a regular diet, he does not exercise but is physically active with work and throughout the day.  He is a nonsmoker.    No previous colonoscopy    Problem List Items Addressed This Visit    None  Visit Diagnoses       Annual physical exam    -  Primary    Relevant Orders    Lipid Panel    Comprehensive Metabolic Panel    Hemoglobin A1C    Screening, lipid        Relevant Orders    Lipid Panel    Screening for diabetes mellitus        Relevant Orders    Hemoglobin A1C    Colon cancer screening        Relevant Orders    Ambulatory referral/consult to Endo Procedure              Review of Systems   Constitutional:  Negative for fatigue, fever and unexpected weight change.   HENT:  Negative for ear pain and sore throat.    Eyes: Negative.  Negative for pain and visual disturbance.   Respiratory:  Negative for cough and shortness of breath.    Cardiovascular:  Negative for chest pain and palpitations.   Gastrointestinal:  Negative for abdominal pain, diarrhea, nausea and vomiting.   Genitourinary:  Negative for dysuria and frequency.   Musculoskeletal:  Negative for arthralgias and myalgias.   Skin:  Negative for color change and rash.   Neurological:  Negative for dizziness and headaches.   Psychiatric/Behavioral:  Negative for sleep disturbance. The patient is not nervous/anxious.        Vitals:    04/30/24 0932   BP: 118/81   Pulse: 75   Resp: 18   Temp: 98.5 °F (36.9 °C)       Objective:     No current outpatient medications on file.     No current facility-administered medications for this visit.        Physical Exam  Vitals and nursing note reviewed.   Constitutional:       General: He is not in acute distress.     Appearance: He is well-developed. He is obese.   HENT:      Head: Normocephalic and atraumatic.   Eyes:      Pupils: Pupils are equal, round, and reactive to light.   Neck:      Vascular: No carotid bruit.   Cardiovascular:      Rate and Rhythm: Normal rate and regular rhythm.   Pulmonary:      Effort: Pulmonary effort is normal.      Breath sounds: Normal breath sounds.   Musculoskeletal:         General: Normal range of motion.      Cervical back: Normal range of motion and neck supple.   Skin:     General: Skin is warm and dry.      Findings: No rash.   Neurological:      Mental Status: He is alert and oriented to person, place, and time.   Psychiatric:         Thought Content: Thought content normal.         Assessment:       1. Annual physical exam    2. Screening, lipid    3. Screening for diabetes mellitus    4. Colon cancer screening        Plan:   Annual physical exam  -     Lipid Panel; Future; Expected date: 04/30/2024  -     Comprehensive Metabolic Panel; Future; Expected date: 04/30/2024  -     Hemoglobin A1C; Future; Expected date: 04/30/2024    Screening, lipid  -     Lipid Panel; Future; Expected date: 04/30/2024    Screening for diabetes mellitus  -     Hemoglobin A1C; Future; Expected date: 04/30/2024    Colon cancer screening  -     Ambulatory referral/consult to Endo Procedure ; Future; Expected date: 05/01/2024        No follow-ups on file.    There are no Patient Instructions on file for this visit.

## 2024-05-03 ENCOUNTER — HOSPITAL ENCOUNTER (OUTPATIENT)
Dept: PREADMISSION TESTING | Facility: HOSPITAL | Age: 49
Discharge: HOME | End: 2024-05-03
Attending: COLON & RECTAL SURGERY
Payer: COMMERCIAL

## 2024-05-03 DIAGNOSIS — Z12.11 COLON CANCER SCREENING: ICD-10-CM

## 2024-06-19 DIAGNOSIS — E87.5 HYPERKALEMIA: Primary | ICD-10-CM

## 2024-09-11 ENCOUNTER — PATIENT MESSAGE (OUTPATIENT)
Dept: FAMILY MEDICINE | Facility: CLINIC | Age: 49
End: 2024-09-11
Payer: COMMERCIAL

## 2025-02-25 ENCOUNTER — PATIENT MESSAGE (OUTPATIENT)
Dept: FAMILY MEDICINE | Facility: CLINIC | Age: 50
End: 2025-02-25
Payer: COMMERCIAL

## 2025-06-04 NOTE — PROGRESS NOTES
Mr. Pillai returns to clinic today.  He is approximately 4-5 weeks status post right distal biceps tendon repair.  He is overall doing well.  He states that he is not having a significant amount of pain     Physical exam:  Examination the right elbow reveals that the incision is healing well.  There is no edema.  Still has a well palpable distal biceps tendon which appeared to be intact.  Range of motion of the elbow is  degrees.  He is able to pronate and supinate.  Does have capillary refill less than 2 seconds in the digits     Assessment:  Status post right distal biceps tendon repair     Plan:    1. Can begin to come out of the brace whenever he is sedentary    2.  I will set him up with occupational therapy to begin range of motion    3.  Will follow up in 4 weeks at which point I will consider discontinuing the brace.      Four.  He will refrain from any weight-bearing to that arm or hand until he reaches the 12 week shaun       04-Jun-2025 08:17

## (undated) DEVICE — GOWN SMARTGOWN LVL4 X-LONG XL

## (undated) DEVICE — Device

## (undated) DEVICE — SUT 4-0 ETHILON 18 PS-2

## (undated) DEVICE — BOWL STERILE LARGE 32OZ

## (undated) DEVICE — YANKAUER OPEN TIP W/O VENT

## (undated) DEVICE — DRAPE HAND STERILE

## (undated) DEVICE — GAUZE SPONGE 4X4 12PLY

## (undated) DEVICE — GLOVE PROTEXIS LTX MICRO 8

## (undated) DEVICE — KIT SAHARA DRAPE DRAW/LIFT

## (undated) DEVICE — BROADBAND LOOP

## (undated) DEVICE — APPLICATOR CHLORAPREP ORN 26ML

## (undated) DEVICE — STRAP OR TABLE 5IN X 72IN

## (undated) DEVICE — TOURNIQUET SB QC DP 18X4IN

## (undated) DEVICE — BANDAGE ESMARK LATEX FREE 4INX

## (undated) DEVICE — SYR 10CC LUER LOCK

## (undated) DEVICE — BANDAGE MATRIX HK LOOP 4IN 5YD

## (undated) DEVICE — ELECTRODE REM PLYHSV RETURN 9

## (undated) DEVICE — SUT ETHILON 4-0 PS2 18 BLK

## (undated) DEVICE — DRESSING XEROFORM FOIL PK 1X8

## (undated) DEVICE — DRAPE STERI-DRAPE 1000 17X11IN

## (undated) DEVICE — NDL HYPO 27G X 1 1/2

## (undated) DEVICE — GLOVE PROTEXIS LTX MICRO  7.5

## (undated) DEVICE — ALCOHOL 70% ISOP RUBBING 4OZ

## (undated) DEVICE — TUBING SUC UNIV W/CONN 12FT

## (undated) DEVICE — SEE L#120831

## (undated) DEVICE — PADDING CAST 4IN SPECIALIST

## (undated) DEVICE — DRAPE THREE-QTR REINF 53X77IN

## (undated) DEVICE — PENCIL ROCKER SWITCH 10FT CORD

## (undated) DEVICE — FORCEP STRAIGHT DISP

## (undated) DEVICE — CORD BIPOLAR 12 FOOT